# Patient Record
Sex: MALE | Race: WHITE | HISPANIC OR LATINO | Employment: OTHER | URBAN - METROPOLITAN AREA
[De-identification: names, ages, dates, MRNs, and addresses within clinical notes are randomized per-mention and may not be internally consistent; named-entity substitution may affect disease eponyms.]

---

## 2023-08-08 ENCOUNTER — TELEPHONE (OUTPATIENT)
Dept: PSYCHIATRY | Facility: CLINIC | Age: 43
End: 2023-08-08

## 2023-08-08 ENCOUNTER — OFFICE VISIT (OUTPATIENT)
Dept: FAMILY MEDICINE CLINIC | Facility: CLINIC | Age: 43
End: 2023-08-08
Payer: COMMERCIAL

## 2023-08-08 ENCOUNTER — TELEPHONE (OUTPATIENT)
Dept: FAMILY MEDICINE CLINIC | Facility: CLINIC | Age: 43
End: 2023-08-08

## 2023-08-08 VITALS
SYSTOLIC BLOOD PRESSURE: 132 MMHG | BODY MASS INDEX: 29.62 KG/M2 | HEIGHT: 69 IN | DIASTOLIC BLOOD PRESSURE: 86 MMHG | TEMPERATURE: 98.6 F | HEART RATE: 74 BPM | WEIGHT: 200 LBS | OXYGEN SATURATION: 99 %

## 2023-08-08 DIAGNOSIS — Z11.59 ENCOUNTER FOR HEPATITIS C SCREENING TEST FOR LOW RISK PATIENT: ICD-10-CM

## 2023-08-08 DIAGNOSIS — Z12.12 SCREENING FOR COLORECTAL CANCER: ICD-10-CM

## 2023-08-08 DIAGNOSIS — F41.9 ANXIETY AND DEPRESSION: ICD-10-CM

## 2023-08-08 DIAGNOSIS — L20.89 OTHER ATOPIC DERMATITIS: ICD-10-CM

## 2023-08-08 DIAGNOSIS — Z13.0 SCREENING FOR DEFICIENCY ANEMIA: ICD-10-CM

## 2023-08-08 DIAGNOSIS — Z11.4 SCREENING FOR HIV (HUMAN IMMUNODEFICIENCY VIRUS): ICD-10-CM

## 2023-08-08 DIAGNOSIS — Z13.6 SCREENING FOR CARDIOVASCULAR CONDITION: ICD-10-CM

## 2023-08-08 DIAGNOSIS — F43.10 PTSD (POST-TRAUMATIC STRESS DISORDER): ICD-10-CM

## 2023-08-08 DIAGNOSIS — F32.A ANXIETY AND DEPRESSION: ICD-10-CM

## 2023-08-08 DIAGNOSIS — E55.9 VITAMIN D DEFICIENCY: ICD-10-CM

## 2023-08-08 DIAGNOSIS — Z12.11 SCREENING FOR COLORECTAL CANCER: ICD-10-CM

## 2023-08-08 DIAGNOSIS — L20.89 OTHER ATOPIC DERMATITIS: Primary | ICD-10-CM

## 2023-08-08 DIAGNOSIS — E03.9 ACQUIRED HYPOTHYROIDISM: ICD-10-CM

## 2023-08-08 DIAGNOSIS — Z13.1 SCREENING FOR DIABETES MELLITUS: ICD-10-CM

## 2023-08-08 DIAGNOSIS — Z76.89 ESTABLISHING CARE WITH NEW DOCTOR, ENCOUNTER FOR: ICD-10-CM

## 2023-08-08 PROBLEM — K90.0 CELIAC DISEASE: Status: ACTIVE | Noted: 2023-08-08

## 2023-08-08 PROCEDURE — 99204 OFFICE O/P NEW MOD 45 MIN: CPT | Performed by: NURSE PRACTITIONER

## 2023-08-08 RX ORDER — LEVOTHYROXINE SODIUM 0.05 MG/1
50 TABLET ORAL DAILY
Qty: 30 TABLET | Refills: 0 | Status: SHIPPED | OUTPATIENT
Start: 2023-08-08

## 2023-08-08 RX ORDER — DOCUSATE SODIUM 100 MG/1
CAPSULE, LIQUID FILLED ORAL
COMMUNITY
Start: 2023-05-17 | End: 2023-08-08 | Stop reason: ALTCHOICE

## 2023-08-08 RX ORDER — LEVOTHYROXINE SODIUM 0.05 MG/1
50 TABLET ORAL DAILY
Qty: 30 TABLET | Refills: 0 | Status: SHIPPED | OUTPATIENT
Start: 2023-08-08 | End: 2023-08-08 | Stop reason: SDUPTHER

## 2023-08-08 RX ORDER — PRAZOSIN HYDROCHLORIDE 5 MG/1
5 CAPSULE ORAL DAILY
COMMUNITY
Start: 2023-05-18

## 2023-08-08 RX ORDER — PAROXETINE HYDROCHLORIDE 20 MG/1
20 TABLET, FILM COATED ORAL DAILY
COMMUNITY

## 2023-08-08 RX ORDER — PAROXETINE HYDROCHLORIDE 40 MG/1
TABLET, FILM COATED ORAL
COMMUNITY
Start: 2023-05-17

## 2023-08-08 RX ORDER — ARIPIPRAZOLE 15 MG/1
15 TABLET ORAL DAILY
COMMUNITY
Start: 2023-05-18

## 2023-08-08 RX ORDER — DOCUSATE SODIUM 50 MG AND SENNOSIDES 8.6 MG 8.6; 5 MG/1; MG/1
1 TABLET, FILM COATED ORAL EVERY MORNING
COMMUNITY
Start: 2023-05-17 | End: 2023-08-08 | Stop reason: ALTCHOICE

## 2023-08-08 RX ORDER — LINACLOTIDE 72 UG/1
1 CAPSULE, GELATIN COATED ORAL EVERY MORNING
COMMUNITY
Start: 2023-05-17

## 2023-08-08 RX ORDER — ERGOCALCIFEROL 1.25 MG/1
50000 CAPSULE ORAL WEEKLY
COMMUNITY
Start: 2023-05-17

## 2023-08-08 RX ORDER — LEVOTHYROXINE SODIUM 0.05 MG/1
50 TABLET ORAL DAILY
COMMUNITY
Start: 2023-05-17 | End: 2023-08-08 | Stop reason: SDUPTHER

## 2023-08-08 RX ORDER — GABAPENTIN 800 MG/1
800 TABLET ORAL 3 TIMES DAILY
COMMUNITY
Start: 2023-05-17

## 2023-08-08 NOTE — TELEPHONE ENCOUNTER
Contacted pt in regards to routine referral and to get placed on proper wait list.     Pt will call back when they are avail.

## 2023-08-08 NOTE — PROGRESS NOTES
Name: Josefina Huertas      : 1980      MRN: [id number]  Encounter Provider: NAY King  Encounter Date: 2023   Encounter department: [address] [place] [address]     1. Other atopic dermatitis  Comments:  Advised tepid water with bathing, pat skin dry, avoid scratching. To apply CeraVe daily. Topical steroid as prescribed. Advisefd to use sparingly. Orders:  -     HYDROCORTISONE, TOPICAL, 2 % LOTN; Apply topically 2 (two) times a day    2. Acquired hypothyroidism  Assessment & Plan:  Currently on levothyroxine 50 mcg. At this time will keep on current dose, advised to obtain labs as ordered. Orders:  -     TSH, 3rd generation with Free T4 reflex; Future  -     levothyroxine 50 mcg tablet; Take 1 tablet (50 mcg total) by mouth daily    3. Anxiety and depression  Assessment & Plan:  Stable on current medications. With being followed by behavioral health therapist and psychiatrist in [place], seeking for local providers. Referrals entered. Orders:  -     Ambulatory Referral to Psychiatry; Future  -     Ambulatory Referral to [facility]; Future    4. PTSD (post-traumatic stress disorder)  -     Ambulatory Referral to Psychiatry; Future  -     Ambulatory Referral to [facility]; Future    5. Vitamin D deficiency  Assessment & Plan:  Currently on 50,000 units of vitamin D weekly. We will recheck level. Orders:  -     Vitamin D 25 hydroxy; Future    6. Encounter for hepatitis C screening test for low risk patient  -     Hepatitis C antibody; Future    7. Screening for HIV (human immunodeficiency virus)  -     HIV 1/2 AG/AB w Reflex [facility] for 2 yr old and above; Future    8. Screening for cardiovascular condition  -     Lipid panel; Future    9. Screening for diabetes mellitus  -     Comprehensive metabolic panel; Future  -     Hemoglobin A1C; Future; Expected date: 2023    10.  Screening for deficiency anemia  - CBC and differential; Future    11. Screening for colorectal cancer  -     Ambulatory referral to Gastroenterology; Future    12. Establishing care with new doctor, encounter for         Subjective      Patient presents to the office accompanied by mother for establishment of care. Recently moved to [facility] from [facility] 3 weeks ago after the death of his brother. 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 Patient with history of hypothyroidism, anxiety and depression, and PTSD. Patient diagnosed back in 2017. Was being followed by psychiatry and [facility] in [facility], seeking for local providers. Stable on current medications. Patient with rash to bilateral arms. Has been treated for dermatitis in the past but ran out of cream that was being used by previous provider. Patient notes flareup of rash in the recent weeks. Notes that when the weather gets hotter and he starts to perspire more, rashes worse. Patient notes pruritus, has been attempting not to scratch or irritate area. Patient denies fever, open wounds, chills, drainage from rash. Review of Systems   Constitutional: Negative for chills, fever and unexpected weight change. HENT: Negative for sore throat and trouble swallowing. Eyes: Negative for photophobia and visual disturbance. Respiratory: Negative for cough and shortness of breath. Cardiovascular: Negative for chest pain and palpitations. Gastrointestinal: Negative for abdominal pain, nausea and vomiting. Genitourinary: Negative for decreased urine volume. Musculoskeletal: Negative for arthralgias and myalgias. Skin: Positive for rash. Negative for color change. Neurological: Negative for dizziness, weakness, light-headedness and headaches. Hematological: Negative for adenopathy. Psychiatric/Behavioral: Negative for agitation and dysphoric mood. The patient is not nervous/anxious.         Current Outpatient Medications on File Prior to Visit   Medication Sig   • ARIPiprazole (ABILIFY) 15 mg tablet Take 15 mg by mouth daily   • ergocalciferol (VITAMIN D2) 50,000 units Take 50,000 Units by mouth once a week   • gabapentin (NEURONTIN) 800 mg tablet Take 800 mg by mouth 3 (three) times a day   • Linzess 72 MCG CAPS Take 1 capsule by mouth every morning   • PARoxetine (PAXIL) 20 mg tablet Take 20 mg by mouth daily Takes with 40 mg tablet every morning   • PARoxetine (PAXIL) 40 MG tablet TAKE 1 TABLET BY MOUTH EVERY MORNING WITH 20MG TABLET   • prazosin (MINIPRESS) 5 mg capsule Take 5 mg by mouth daily   • [DISCONTINUED] levothyroxine 50 mcg tablet Take 50 mcg by mouth daily   • [DISCONTINUED] docusate sodium (COLACE) 100 mg capsule TAKE 1 CAPSULE BY MOUTH EVERY MORNING AND EVERY NIGHT AT BEDTIME (Patient not taking: Reported on 8/8/2023)   • [DISCONTINUED] Stimulant Laxative 8.6-50 MG per tablet Take 1 tablet by mouth every morning (Patient not taking: Reported on 8/8/2023)       Objective     /86 (BP Location: Left arm, Patient Position: Sitting, Cuff Size: Standard)   Pulse 74   Temp 98.6 °F (37 °C) (Tympanic)   Ht 5' 9" (1.753 m)   Wt 90.7 kg (200 lb)   SpO2 99%   BMI 29.53 kg/m²     Physical Exam  Vitals reviewed. Constitutional:       General: He is not in acute distress. Appearance: Normal appearance. He is not ill-appearing. HENT:      Head: Normocephalic and atraumatic. Right Ear: Tympanic membrane, ear canal and external ear normal.      Left Ear: Tympanic membrane, ear canal and external ear normal.      Nose: Nose normal.      Mouth/Throat:      Mouth: Mucous membranes are moist.      Pharynx: Oropharynx is clear. Eyes:      Conjunctiva/sclera: Conjunctivae normal.      Pupils: Pupils are equal, round, and reactive to light. Cardiovascular:      Rate and Rhythm: Normal rate and regular rhythm. Pulses: Normal pulses. Heart sounds: Normal heart sounds. No murmur heard.   Pulmonary:      Effort: Pulmonary effort is normal.      Breath sounds: Normal breath sounds. Abdominal:      General: Bowel sounds are normal.      Palpations: Abdomen is soft. Tenderness: There is no abdominal tenderness. Musculoskeletal:         General: Normal range of motion. Cervical back: Normal range of motion and neck supple. Skin:     General: Skin is warm and dry. Capillary Refill: Capillary refill takes less than 2 seconds. Findings: Rash (noted to bilateral forearms) present. Rash is macular and scaling. Neurological:      General: No focal deficit present. Mental Status: He is alert and oriented to person, place, and time.    Psychiatric:         Mood and Affect: Mood normal.         Behavior: Behavior normal.       NAY Saavedra

## 2023-08-08 NOTE — TELEPHONE ENCOUNTER
Patient's pharmacy called requesting hydrocordizone sent over was 2%.  They have 2.5%  Please send over corrected order

## 2023-08-08 NOTE — ASSESSMENT & PLAN NOTE
Stable on current medications. With being followed by behavioral health therapist and psychiatrist in [place], seeking for local providers. Referrals entered.

## 2023-08-08 NOTE — PATIENT INSTRUCTIONS
Eczema   AMBULATORY CARE:   Eczema  is an itchy, red skin rash. You are more likely to have it if your parent or a family member has eczema, asthma, or hay fever. Eczema is a long-term condition. You may have flare-ups from time to time for the rest of your life. Signs and symptoms:   Patches of dry, red, itchy skin    Bumps or blisters that crust over or ooze clear fluid    Areas of skin that are thick, scaly, or hard and leather-like    Eczema triggers:  Anything that increases dryness or makes you want to scratch is a trigger. Triggers may cause eczema to flare up. The following are common triggers:  Frequent baths or showers  can lead to dry, itchy skin. Sudden temperature changes , such as cold air, dries out your skin. Heat can increase sweating. Both can make you itch. Allergens  such as dust mites and pet dander can make your symptoms worse. Pollen, mold, and cigarette smoke may also irritate your skin. Some kinds of soap, makeup, and household   may bother your skin. Ask your healthcare provider about mild products you can use. Stress  may cause your eczema to get worse. Seek care immediately if:   You develop a fever or have red streaks going up your arm or leg. Your rash gets more swollen, red, or hot. Call your doctor if:   Most of your skin is red, swollen, painful, and covered with scales. You develop bloody, red, painful crusts. Your skin blisters and oozes white or yellow pus. You have questions or concerns about your condition or care. Treatment for eczema  is aimed at reducing pain and itching, and adding moisture to your skin. Your symptoms should improve after 3 weeks of treatment. There is no cure for eczema. You may need the following:  Medicines may help reduce itching, redness, pain, and swelling. They may be given as a cream or pill. You may also receive antibiotics if you have a skin infection.     Phototherapy , or light therapy, may help heal your skin. Care for your skin:   Do not scratch. Pat or press on your skin to relieve itching. Your symptoms will get worse if you scratch. Keep your fingernails short so you do not tear your skin if you do scratch. Keep your skin moist.  Rub lotion, cream, or ointment into your skin at least 2 times a day. Ask your healthcare provider what to use and how often to use it. Take baths or showers  with warm water for 10 minutes or less. Use mild bar soap. Ask your healthcare provider for the best soap for you to use. Wear cotton clothes. Wear loose-fitting clothes made from cotton or cotton blends. Avoid wool. Use a humidifier  to add moisture to the air in your home. Avoid changes in temperature , especially activities that cause you to sweat a lot. Sweat can cause itching. Remove blankets from your bed if you get hot while you sleep. Avoid allergens, dust, and skin irritants. Do not use perfume, fabric softener, or makeup that burns or itches. Follow up with your doctor as directed:  Write down your questions so you remember to ask them during your visits. © Copyright [employer] 2022 Information is for End User's use only and may not be sold, redistributed or otherwise used for commercial purposes. The above information is an  only. It is not intended as medical advice for individual conditions or treatments. Talk to your doctor, nurse or pharmacist before following any medical regimen to see if it is safe and effective for you.

## 2023-08-08 NOTE — ASSESSMENT & PLAN NOTE
Currently on levothyroxine 50 mcg. At this time will keep on current dose, advised to obtain labs as ordered.

## 2023-08-09 ENCOUNTER — APPOINTMENT (OUTPATIENT)
Dept: LAB | Facility: HOSPITAL | Age: 43
End: 2023-08-09
Payer: COMMERCIAL

## 2023-08-09 DIAGNOSIS — Z11.59 ENCOUNTER FOR HEPATITIS C SCREENING TEST FOR LOW RISK PATIENT: ICD-10-CM

## 2023-08-09 DIAGNOSIS — Z13.6 SCREENING FOR CARDIOVASCULAR CONDITION: ICD-10-CM

## 2023-08-09 DIAGNOSIS — E03.9 ACQUIRED HYPOTHYROIDISM: ICD-10-CM

## 2023-08-09 DIAGNOSIS — L20.89 OTHER ATOPIC DERMATITIS: Primary | ICD-10-CM

## 2023-08-09 DIAGNOSIS — E55.9 VITAMIN D DEFICIENCY: ICD-10-CM

## 2023-08-09 DIAGNOSIS — Z13.0 SCREENING FOR DEFICIENCY ANEMIA: ICD-10-CM

## 2023-08-09 DIAGNOSIS — Z11.4 SCREENING FOR HIV (HUMAN IMMUNODEFICIENCY VIRUS): ICD-10-CM

## 2023-08-09 DIAGNOSIS — Z13.1 SCREENING FOR DIABETES MELLITUS: ICD-10-CM

## 2023-08-09 LAB
25(OH)D3 SERPL-MCNC: 16.3 NG/ML (ref 30–100)
ALBUMIN SERPL BCP-MCNC: 4.7 G/DL (ref 3.5–5)
ALP SERPL-CCNC: 76 U/L (ref 34–104)
ALT SERPL W P-5'-P-CCNC: 68 U/L (ref 7–52)
ANION GAP SERPL CALCULATED.3IONS-SCNC: 5 MMOL/L
AST SERPL W P-5'-P-CCNC: 38 U/L (ref 13–39)
BASOPHILS # BLD AUTO: 0.06 THOUSANDS/ÂΜL (ref 0–0.1)
BASOPHILS NFR BLD AUTO: 1 % (ref 0–1)
BILIRUB SERPL-MCNC: 0.44 MG/DL (ref 0.2–1)
BUN SERPL-MCNC: 14 MG/DL (ref 5–25)
CALCIUM SERPL-MCNC: 10.1 MG/DL (ref 8.4–10.2)
CHLORIDE SERPL-SCNC: 105 MMOL/L (ref 96–108)
CHOLEST SERPL-MCNC: 206 MG/DL
CO2 SERPL-SCNC: 28 MMOL/L (ref 21–32)
CREAT SERPL-MCNC: 0.93 MG/DL (ref 0.6–1.3)
EOSINOPHIL # BLD AUTO: 0.18 THOUSAND/ÂΜL (ref 0–0.61)
EOSINOPHIL NFR BLD AUTO: 3 % (ref 0–6)
ERYTHROCYTE [DISTWIDTH] IN BLOOD BY AUTOMATED COUNT: 12.9 % (ref 11.6–15.1)
GFR SERPL CREATININE-BSD FRML MDRD: 98 ML/MIN/1.73SQ M
GLUCOSE P FAST SERPL-MCNC: 98 MG/DL (ref 65–99)
HCT VFR BLD AUTO: 44.7 % (ref 36.5–49.3)
HCV AB SER QL: NORMAL
HDLC SERPL-MCNC: 32 MG/DL
HGB BLD-MCNC: 15 G/DL (ref 12–17)
HIV 1+2 AB+HIV1 P24 AG SERPL QL IA: NORMAL
HIV 2 AB SERPL QL IA: NORMAL
HIV1 AB SERPL QL IA: NORMAL
HIV1 P24 AG SERPL QL IA: NORMAL
IMM GRANULOCYTES # BLD AUTO: 0.01 THOUSAND/UL (ref 0–0.2)
IMM GRANULOCYTES NFR BLD AUTO: 0 % (ref 0–2)
LDLC SERPL CALC-MCNC: 151 MG/DL (ref 0–100)
LYMPHOCYTES # BLD AUTO: 2.4 THOUSANDS/ÂΜL (ref 0.6–4.47)
LYMPHOCYTES NFR BLD AUTO: 36 % (ref 14–44)
MCH RBC QN AUTO: 29 PG (ref 26.8–34.3)
MCHC RBC AUTO-ENTMCNC: 33.6 G/DL (ref 31.4–37.4)
MCV RBC AUTO: 86 FL (ref 82–98)
MONOCYTES # BLD AUTO: 0.51 THOUSAND/ÂΜL (ref 0.17–1.22)
MONOCYTES NFR BLD AUTO: 8 % (ref 4–12)
NEUTROPHILS # BLD AUTO: 3.58 THOUSANDS/ÂΜL (ref 1.85–7.62)
NEUTS SEG NFR BLD AUTO: 52 % (ref 43–75)
NONHDLC SERPL-MCNC: 174 MG/DL
NRBC BLD AUTO-RTO: 0 /100 WBCS
PLATELET # BLD AUTO: 360 THOUSANDS/UL (ref 149–390)
PMV BLD AUTO: 10.3 FL (ref 8.9–12.7)
POTASSIUM SERPL-SCNC: 4 MMOL/L (ref 3.5–5.3)
PROT SERPL-MCNC: 7.9 G/DL (ref 6.4–8.4)
RBC # BLD AUTO: 5.18 MILLION/UL (ref 3.88–5.62)
SODIUM SERPL-SCNC: 138 MMOL/L (ref 135–147)
T4 FREE SERPL-MCNC: 0.73 NG/DL (ref 0.61–1.12)
TRIGL SERPL-MCNC: 113 MG/DL
TSH SERPL DL<=0.05 MIU/L-ACNC: 5.86 UIU/ML (ref 0.45–4.5)
WBC # BLD AUTO: 6.74 THOUSAND/UL (ref 4.31–10.16)

## 2023-08-09 PROCEDURE — 80061 LIPID PANEL: CPT

## 2023-08-09 PROCEDURE — 83036 HEMOGLOBIN GLYCOSYLATED A1C: CPT

## 2023-08-09 PROCEDURE — 85025 COMPLETE CBC W/AUTO DIFF WBC: CPT

## 2023-08-09 PROCEDURE — 84439 ASSAY OF FREE THYROXINE: CPT

## 2023-08-09 PROCEDURE — 80053 COMPREHEN METABOLIC PANEL: CPT

## 2023-08-09 PROCEDURE — 36415 COLL VENOUS BLD VENIPUNCTURE: CPT

## 2023-08-09 PROCEDURE — 82306 VITAMIN D 25 HYDROXY: CPT

## 2023-08-09 PROCEDURE — 86803 HEPATITIS C AB TEST: CPT

## 2023-08-09 PROCEDURE — 87389 HIV-1 AG W/HIV-1&-2 AB AG IA: CPT

## 2023-08-09 PROCEDURE — 84443 ASSAY THYROID STIM HORMONE: CPT

## 2023-08-10 LAB
EST. AVERAGE GLUCOSE BLD GHB EST-MCNC: 117 MG/DL
HBA1C MFR BLD: 5.7 %

## 2023-08-28 ENCOUNTER — TELEPHONE (OUTPATIENT)
Dept: PSYCHIATRY | Facility: CLINIC | Age: 43
End: 2023-08-28

## 2023-08-28 ENCOUNTER — TELEMEDICINE (OUTPATIENT)
Dept: BEHAVIORAL/MENTAL HEALTH CLINIC | Facility: CLINIC | Age: 43
End: 2023-08-28
Payer: COMMERCIAL

## 2023-08-28 DIAGNOSIS — F32.A ANXIETY AND DEPRESSION: ICD-10-CM

## 2023-08-28 DIAGNOSIS — F41.9 ANXIETY AND DEPRESSION: ICD-10-CM

## 2023-08-28 DIAGNOSIS — F43.10 PTSD (POST-TRAUMATIC STRESS DISORDER): Primary | ICD-10-CM

## 2023-08-28 PROCEDURE — 90834 PSYTX W PT 45 MINUTES: CPT | Performed by: SOCIAL WORKER

## 2023-08-29 NOTE — PSYCH
Visit start and stop times:    08/28/23  Start Time: 0900  Stop Time: 0942  Total Visit Time: 42 minutes  Virtual Regular Visit  Therapist met w/pt who is a 37year old male for initial session due to increased symptoms of anxiety, depression, and PTSD. Symptoms include: hopelessness, sadness, irritability, intrusive thoughts. He stated he recently relocated to [place] last month due to a tragic event. He stated he witnessed his brother get murdered in [facility] where they lived. He stated that he has had a lot of trauma in his life and has been in therapy since 2013. He stated that he lost custody of his kids in 2013 which was devastating for him. He stated that his happiest moment in life was when his kids were born. He stated he feels the medication he is on isn't working as much and has a referral for psychiatry. Pt stated that he sees his PCP in a few days. Therapist encouraged pt to talk to her as well to see if there is anything else she can do on her end. Therapist and pt discussed goals pt wants to work on. Pt stated that he has a lot of responsibilities and at times feels overwhelmed so needs additional support and a safe place to "vent."  Pt shared he can't let his family know how much he is struggling because he is the "strong" one. Pt stated that he has some coping skills that he has been trying to implement and at times they are helpful. Verification of patient location:    Patient is located at Home in the following state in which I hold an active license [place]      Assessment/Plan: f/u in 2 weeks    Problem List Items Addressed This Visit        Other    PTSD (post-traumatic stress disorder) - Primary    Anxiety and depression            Reason for visit is No chief complaint on file.        Encounter provider [employer]    Provider located at [address] [place] [address] [place] [address]  [phone]      Recent Visits  Date Type Provider Dept   08/28/23 Telemedicine [address] [place] [address] recent visits within past 7 days and meeting all other requirements  Future Appointments  No visits were found meeting these conditions. Showing future appointments within next 150 days and meeting all other requirements       The patient was identified by name and date of birth. Sebastian Chen was informed that this is a telemedicine visit and [facility] He agrees to proceed. .  My office door was closed. No one else was in the room. He acknowledged consent and understanding of privacy and security of the video platform. The patient has agreed to participate and understands they can discontinue the visit at any time. Patient is aware this is a billable service. Subjective  Sebastian Chen is a 37 y.o. male  .       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       Past Surgical History:   Procedure Laterality Date   • APPENDECTOMY         Current Outpatient Medications   Medication Sig Dispense Refill   • ARIPiprazole (ABILIFY) 15 mg tablet Take 15 mg by mouth daily     • ergocalciferol (VITAMIN D2) 50,000 units Take 50,000 Units by mouth once a week     • gabapentin (NEURONTIN) 800 mg tablet Take 800 mg by mouth 3 (three) times a day     • hydrocortisone 2.5 % cream Apply topically 2 (two) times a day 28 g 1   • levothyroxine 50 mcg tablet Take 1 tablet (50 mcg total) by mouth daily 30 tablet 0   • Linzess 72 MCG CAPS Take 1 capsule by mouth every morning     • PARoxetine (PAXIL) 20 mg tablet Take 20 mg by mouth daily Takes with 40 mg tablet every morning     • PARoxetine (PAXIL) 40 MG tablet TAKE 1 TABLET BY MOUTH EVERY MORNING WITH 20MG TABLET     • prazosin (MINIPRESS) 5 mg capsule Take 5 mg by mouth daily       No current facility-administered medications for this visit. Allergies   Allergen Reactions   • Bee Venom Eye Swelling and Facial Swelling   • Other Throat Swelling     mushrooms       Review of Systems    Video Exam    There were no vitals filed for this visit.     Physical Exam Pt denied any SI/HI/Ah/VH

## 2023-09-01 ENCOUNTER — OFFICE VISIT (OUTPATIENT)
Dept: FAMILY MEDICINE CLINIC | Facility: CLINIC | Age: 43
End: 2023-09-01
Payer: COMMERCIAL

## 2023-09-01 VITALS
BODY MASS INDEX: 29.47 KG/M2 | TEMPERATURE: 98.3 F | WEIGHT: 199 LBS | HEART RATE: 69 BPM | DIASTOLIC BLOOD PRESSURE: 94 MMHG | SYSTOLIC BLOOD PRESSURE: 146 MMHG | OXYGEN SATURATION: 100 % | HEIGHT: 69 IN

## 2023-09-01 DIAGNOSIS — K64.9 HEMORRHOIDS, UNSPECIFIED HEMORRHOID TYPE: Primary | ICD-10-CM

## 2023-09-01 DIAGNOSIS — E55.9 VITAMIN D DEFICIENCY: ICD-10-CM

## 2023-09-01 DIAGNOSIS — R73.03 PREDIABETES: ICD-10-CM

## 2023-09-01 DIAGNOSIS — E03.9 ACQUIRED HYPOTHYROIDISM: ICD-10-CM

## 2023-09-01 DIAGNOSIS — Z00.00 ANNUAL PHYSICAL EXAM: ICD-10-CM

## 2023-09-01 DIAGNOSIS — E78.2 MIXED HYPERLIPIDEMIA: ICD-10-CM

## 2023-09-01 PROCEDURE — 99214 OFFICE O/P EST MOD 30 MIN: CPT | Performed by: NURSE PRACTITIONER

## 2023-09-01 PROCEDURE — 99396 PREV VISIT EST AGE 40-64: CPT | Performed by: NURSE PRACTITIONER

## 2023-09-01 RX ORDER — PROPRANOLOL HYDROCHLORIDE 20 MG/1
20 TABLET ORAL EVERY EVENING
COMMUNITY
Start: 2023-08-10

## 2023-09-01 RX ORDER — HYDROCORTISONE ACETATE 25 MG/1
25 SUPPOSITORY RECTAL 2 TIMES DAILY
Qty: 12 SUPPOSITORY | Refills: 0 | Status: SHIPPED | OUTPATIENT
Start: 2023-09-01

## 2023-09-01 RX ORDER — ERGOCALCIFEROL 1.25 MG/1
50000 CAPSULE ORAL 2 TIMES WEEKLY
Qty: 24 CAPSULE | Refills: 0 | Status: SHIPPED | OUTPATIENT
Start: 2023-09-04

## 2023-09-01 RX ORDER — LEVOTHYROXINE SODIUM 0.07 MG/1
75 TABLET ORAL
Qty: 90 TABLET | Refills: 3 | Status: SHIPPED | OUTPATIENT
Start: 2023-09-01

## 2023-09-01 RX ORDER — ROSUVASTATIN CALCIUM 5 MG/1
5 TABLET, COATED ORAL DAILY
Qty: 90 TABLET | Refills: 1 | Status: SHIPPED | OUTPATIENT
Start: 2023-09-01

## 2023-09-01 RX ORDER — OMEPRAZOLE 40 MG/1
40 CAPSULE, DELAYED RELEASE ORAL DAILY
COMMUNITY
Start: 2023-08-10

## 2023-09-01 RX ORDER — DOCUSATE SODIUM 50 MG AND SENNOSIDES 8.6 MG 8.6; 5 MG/1; MG/1
1 TABLET, FILM COATED ORAL DAILY
COMMUNITY
Start: 2023-08-10

## 2023-09-01 RX ORDER — DOCUSATE SODIUM 100 MG/1
100 CAPSULE, LIQUID FILLED ORAL 2 TIMES DAILY
COMMUNITY
Start: 2023-08-10

## 2023-09-01 RX ORDER — HYDROXYZINE PAMOATE 50 MG/1
CAPSULE ORAL
COMMUNITY
Start: 2023-08-10

## 2023-09-01 NOTE — ASSESSMENT & PLAN NOTE
Recent blood work and ASCVD risk score reviewed with patient. Options to lower lipid panel discussed including low-fat diet, exercise, weight loss, and statin. Patient is agreeable to initiation of statin at this time. To start rosuvastatin 5 mg. To repeat labs in 3 to 4 months.

## 2023-09-01 NOTE — PROGRESS NOTES
[address]    NAME: Reanna Ramachandran  AGE: 37 y.o. SEX: male  : 1980     DATE: 2023     Assessment and Plan:     Problem List Items Addressed This Visit        Endocrine    Acquired hypothyroidism     TSH not within normal limits. As per patient, takes levothyroxine as prescribed. We will increase levothyroxine from 50 mcg to 75 mcg. Repeat blood work in 6 to 8 weeks         Relevant Medications    propranolol (INDERAL) 20 mg tablet    levothyroxine (Euthyrox) 75 mcg tablet    Other Relevant Orders    TSH, 3rd generation with Free T4 reflex       Other    Vitamin D deficiency     Vitamin D level still deficient despite being on weekly dose of vitamin D. At this time advised patient to take vitamin D 50,000 units twice weekly with food for 12 weeks. Repeat labs in 3 to 4 months. Relevant Medications    ergocalciferol (VITAMIN D2) 50,000 units (Start on 2023)    Other Relevant Orders    Vitamin D 25 hydroxy    Prediabetes     Recent blood work reviewed with patient. Discussed options to help lower A1C such as aerobic activity, weight management, foods that are low in starch and eating leaner protein and whole grains. Mixed hyperlipidemia     Recent blood work and ASCVD risk score reviewed with patient. Options to lower lipid panel discussed including low-fat diet, exercise, weight loss, and statin. Patient is agreeable to initiation of statin at this time. To start rosuvastatin 5 mg. To repeat labs in 3 to 4 months. Relevant Medications    rosuvastatin (CRESTOR) 5 mg tablet    Other Relevant Orders    Lipid panel    Comprehensive metabolic panel   Other Visit Diagnoses     Hemorrhoids, unspecified hemorrhoid type    -  Primary    Advised increase hydration, fiber. To avoid straining and heavy lifting. Medications as prescribed.  To scheduled colonoscopy, refer to [place]-rectal if tx fails    Relevant Medications    hydrocortisone (ANUSOL-HC) 25 mg suppository    Annual physical exam              Immunizations and preventive care screenings were discussed with patient today. Appropriate education was printed on patient's after visit summary. Discussed risks and benefits of prostate cancer screening. We discussed the controversial history of PSA screening for prostate cancer in the [facility] as well as the risk of over detection and over treatment of prostate cancer by way of PSA screening. The patient understands that PSA blood testing is an imperfect way to screen for prostate cancer and that elevated PSA levels in the blood may also be caused by infection, inflammation, prostatic trauma or manipulation, urological procedures, or by benign prostatic enlargement. The role of the digital rectal examination in prostate cancer screening was also discussed and I discussed with him that there is large interobserver variability in the findings of digital rectal examination. Counseling:  Alcohol/drug use: discussed moderation in alcohol intake, the recommendations for healthy alcohol use, and avoidance of illicit drug use. Dental Health: discussed importance of regular tooth brushing, flossing, and dental visits. Injury prevention: discussed safety/seat belts, safety helmets, smoke detectors, carbon dioxide detectors, and smoking near bedding or upholstery. Sexual health: discussed sexually transmitted diseases, partner selection, use of condoms, avoidance of unintended pregnancy, and contraceptive alternatives. · Exercise: the importance of regular exercise/physical activity was discussed. Recommend exercise 3-5 times per week for at least 30 minutes. BMI Counseling: Body mass index is 29.39 kg/m².  The BMI is above normal. Nutrition recommendations include decreasing portion sizes, encouraging healthy choices of fruits and vegetables, consuming healthier snacks, limiting drinks that contain sugar, moderation in carbohydrate intake, increasing intake of lean protein, reducing intake of saturated and trans fat and reducing intake of cholesterol. Exercise recommendations include exercising 3-5 times per week and strength training exercises. No pharmacotherapy was ordered. Rationale for BMI follow-up plan is due to patient being overweight or obese. Return in about 4 months (around 1/1/2024) for Recheck. Chief Complaint:     Chief Complaint   Patient presents with   • Annual Exam      History of Present Illness:     Adult Annual Physical   Patient here for a comprehensive physical exam. The patient reports problems - Recurrence of hemorrhoids. Notes history of internal and external hemorrhoids. Notes increased rectal pain and discomfort, especially prior to bowel movements. Notes history of Celiac's with irregular bowel movements. Does use Colace. Has been using Preparation H with mild relief. Patient denies melena, abdominal pain. Diet and Physical Activity  · Diet/Nutrition: gluten-free diet. · Exercise: walking and 5-7 times a week on average. Depression Screening  PHQ-2/9 Depression Screening         General Health  · Sleep: gets 4-6 hours of sleep on average. · Hearing: normal - bilateral.  · Vision: most recent eye exam >1 year ago and wears glasses. · Dental: no dental visits for >1 year, brushes teeth twice daily and does not floss.  Health  · Symptoms include: none     Review of Systems:     Review of Systems   Constitutional: Negative for activity change, appetite change, diaphoresis and unexpected weight change. HENT: Negative for congestion, ear pain, sinus pressure, sinus pain, sore throat and trouble swallowing. Eyes: Negative for photophobia and visual disturbance. Respiratory: Negative for cough, chest tightness and shortness of breath. Cardiovascular: Negative for chest pain and palpitations.    Gastrointestinal: Positive for rectal pain. Negative for abdominal pain, anal bleeding, blood in stool, constipation, diarrhea, nausea and vomiting. Endocrine: Negative for polydipsia, polyphagia and polyuria. Genitourinary: Negative for decreased urine volume, dysuria, flank pain, frequency, hematuria, testicular pain and urgency. Musculoskeletal: Negative for arthralgias, back pain and myalgias. Skin: Negative for color change and rash. Neurological: Negative for dizziness, syncope, weakness, light-headedness, numbness and headaches. Hematological: Negative for adenopathy. Psychiatric/Behavioral: Negative for confusion and dysphoric mood. The patient is not nervous/anxious. 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      Past Surgical History:     Past Surgical History:   Procedure Laterality Date   • APPENDECTOMY        Family History:     Family History   Problem Relation Age of Onset   • Alzheimer's disease Mother    • Hypothyroidism Mother    • No Known Problems Father       Social History:     Social History     Socioeconomic History   • Marital status: Single     Spouse name: None   • Number of children: None   • Years of education: None   • Highest education level: None   Occupational History   • None   Tobacco Use   • Smoking status: Every Day     Packs/day: 0.25     Years: 5.00     Total pack years: 1.25     Types: Cigarettes     Start date: 44     Passive exposure:  Yes   • Smokeless tobacco: Never   Vaping Use   • Vaping Use: Never used   Substance and Sexual Activity   • Alcohol use: Never   • Drug use: Never   • Sexual activity: Not Currently     Partners: Female     Birth control/protection: None   Other Topics Concern   • None   Social History Narrative   • None     Social Determinants of Health     Financial Resource Strain: Not on file   Food Insecurity: Not on file   Transportation Needs: Not on file   Physical Activity: Not on file   Stress: Not on file   Social Connections: Not on file   Intimate Partner Violence: Not on file   Housing Stability: Not on file      Current Medications:     Current Outpatient Medications   Medication Sig Dispense Refill   • ARIPiprazole (ABILIFY) 15 mg tablet Take 15 mg by mouth daily     • docusate sodium (COLACE) 100 mg capsule Take 100 mg by mouth 2 (two) times a day     • [START ON 9/4/2023] ergocalciferol (VITAMIN D2) 50,000 units Take 1 capsule (50,000 Units total) by mouth 2 (two) times a week 24 capsule 0   • gabapentin (NEURONTIN) 800 mg tablet Take 800 mg by mouth 3 (three) times a day     • hydrocortisone (ANUSOL-HC) 25 mg suppository Insert 1 suppository (25 mg total) into the rectum 2 (two) times a day 12 suppository 0   • hydrocortisone 2.5 % cream Apply topically 2 (two) times a day 28 g 1   • hydrOXYzine pamoate (VISTARIL) 50 mg capsule TAKE 1 CAPSULE BY MOUTH THREE TIMES DAILY AS NEEDED FOR ANXIETY     • levothyroxine (Euthyrox) 75 mcg tablet Take 1 tablet (75 mcg total) by mouth daily in the early morning 90 tablet 3   • Linzess 72 MCG CAPS Take 1 capsule by mouth every morning     • omeprazole (PriLOSEC) 40 MG capsule Take 40 mg by mouth daily     • PARoxetine (PAXIL) 20 mg tablet Take 20 mg by mouth daily Takes with 40 mg tablet every morning     • PARoxetine (PAXIL) 40 MG tablet TAKE 1 TABLET BY MOUTH EVERY MORNING WITH 20MG TABLET     • prazosin (MINIPRESS) 5 mg capsule Take 5 mg by mouth daily     • propranolol (INDERAL) 20 mg tablet Take 20 mg by mouth every evening     • rosuvastatin (CRESTOR) 5 mg tablet Take 1 tablet (5 mg total) by mouth daily 90 tablet 1   • Stimulant Laxative 8.6-50 MG per tablet Take 1 tablet by mouth daily       No current facility-administered medications for this visit. Allergies:      Allergies   Allergen Reactions   • Bee Venom Eye Swelling and Facial Swelling   • Other Throat Swelling     mushrooms      Physical Exam:     /94 (BP Location: Left arm, Patient Position: Sitting)   Pulse 69   Temp 98.3 °F (36.8 °C)   Ht 5' 9" (1.753 m)   Wt 90.3 kg (199 lb)   SpO2 100%   BMI 29.39 kg/m²     Physical Exam  Vitals reviewed. Constitutional:       General: He is not in acute distress. Appearance: Normal appearance. He is well-developed. He is not ill-appearing. HENT:      Head: Normocephalic and atraumatic. Right Ear: Tympanic membrane, ear canal and external ear normal.      Left Ear: Tympanic membrane, ear canal and external ear normal.      Nose: Nose normal.      Mouth/Throat:      Mouth: Mucous membranes are moist.      Pharynx: Oropharynx is clear. Eyes:      Extraocular Movements: Extraocular movements intact. Conjunctiva/sclera: Conjunctivae normal.      Pupils: Pupils are equal, round, and reactive to light. Neck:      Vascular: No carotid bruit. Cardiovascular:      Rate and Rhythm: Normal rate and regular rhythm. Pulses: Normal pulses. Heart sounds: Normal heart sounds. No murmur heard. Pulmonary:      Effort: Pulmonary effort is normal. No respiratory distress. Breath sounds: Normal breath sounds. Abdominal:      General: Bowel sounds are normal.      Palpations: Abdomen is soft. There is no mass. Tenderness: There is no abdominal tenderness. There is no right CVA tenderness or left CVA tenderness. Genitourinary:     Comments: Patient refusing rectal exam at this time  Musculoskeletal:         General: No swelling. Normal range of motion. Cervical back: Normal range of motion and neck supple. Right lower leg: No edema. Left lower leg: No edema. Lymphadenopathy:      Cervical: No cervical adenopathy. Skin:     General: Skin is warm and dry. Capillary Refill: Capillary refill takes less than 2 seconds. Neurological:      General: No focal deficit present. Mental Status: He is alert. Psychiatric:         Mood and Affect: Mood normal.         Behavior: Behavior normal.          Elyssa Jones, [address] [facility]

## 2023-09-01 NOTE — ASSESSMENT & PLAN NOTE
TSH not within normal limits. As per patient, takes levothyroxine as prescribed. We will increase levothyroxine from 50 mcg to 75 mcg.   Repeat blood work in 6 to 8 weeks

## 2023-09-01 NOTE — ASSESSMENT & PLAN NOTE
Vitamin D level still deficient despite being on weekly dose of vitamin D. At this time advised patient to take vitamin D 50,000 units twice weekly with food for 12 weeks. Repeat labs in 3 to 4 months.

## 2023-09-01 NOTE — PATIENT INSTRUCTIONS
Recheck TSH in 6-8 weeks  Recheck lipid panel, CMP, Vitamin D level in 3-4 months        Wellness Visit for Adults   AMBULATORY CARE:   A wellness visit  is when you see your healthcare provider to get screened for health problems. Your healthcare provider will also give you advice on how to stay healthy. Write down your questions so you remember to ask them. Ask your healthcare provider how often you should have a wellness visit. What happens at a wellness visit:  Your healthcare provider will ask about your health, and your family history of health problems. This includes high blood pressure, heart disease, and cancer. He or she will ask if you have symptoms that concern you, if you smoke, and about your mood. You may also be asked about your intake of medicines, supplements, food, and alcohol. Any of the following may be done: Your weight  will be checked. Your height may also be checked so your body mass index (BMI) can be calculated. Your BMI shows if you are at a healthy weight. Your blood pressure  and heart rate will be checked. Your temperature may also be checked. Blood and urine tests  may be done. Blood tests may be done to check your cholesterol levels. Abnormal cholesterol levels increase your risk for heart disease and stroke. You may also need a blood or urine test to check for diabetes if you are at increased risk. Urine tests may be done to look for signs of an infection or kidney disease. A physical exam  includes checking your heartbeat and lungs with a stethoscope. Your healthcare provider may also check your skin to look for sun damage. Screening tests  may be recommended. A screening test is done to check for diseases that may not cause symptoms. The screening tests you may need depend on your age, gender, family history, and lifestyle habits. For example, colorectal screening may be recommended if you are 48years old or older.     Screening tests you need if you are a woman:   A Pap smear  is used to screen for cervical cancer. Pap smears are usually done every 3 to 5 years depending on your age. You may need them more often if you have had abnormal Pap smear test results in the past. Ask your healthcare provider how often you should have a Pap smear. A mammogram  is an x-ray of your breasts to screen for breast cancer. Experts recommend mammograms every 2 years starting at age 48 years. You may need a mammogram at age 52 years or younger if you have an increased risk for breast cancer. Talk to your healthcare provider about when you should start having mammograms and how often you need them. Vaccines you may need:   Get an influenza vaccine  every year. The influenza vaccine protects you from the flu. Several types of viruses cause the flu. The viruses change over time, so new vaccines are made each year. Get a tetanus-diphtheria (Td) booster vaccine  every 10 years. This vaccine protects you against tetanus and diphtheria. Tetanus is a severe infection that may cause painful muscle spasms and lockjaw. Diphtheria is a severe bacterial infection that causes a thick covering in the back of your mouth and throat. Get a human papillomavirus (HPV) vaccine  if you are female and aged 23 to 32 or male 23 to 24 and never received it. This vaccine protects you from HPV infection. HPV is the most common infection spread by sexual contact. HPV may also cause vaginal, penile, and anal cancers. Get a pneumococcal vaccine  if you are aged 72 years or older. The pneumococcal vaccine is an injection given to protect you from pneumococcal disease. Pneumococcal disease is an infection caused by pneumococcal bacteria. The infection may cause pneumonia, meningitis, or an ear infection. Get a shingles vaccine  if you are 60 or older, even if you have had shingles before. The shingles vaccine is an injection to protect you from the varicella-zoster virus.  This is the same virus that causes chickenpox. Shingles is a painful rash that develops in people who had chickenpox or have been exposed to the virus. How to eat healthy:  My Plate is a model for planning healthy meals. It shows the types and amounts of foods that should go on your plate. Fruits and vegetables make up about half of your plate, and grains and protein make up the other half. A serving of dairy is included on the side of your plate. The amount of calories and serving sizes you need depends on your age, gender, weight, and height. Examples of healthy foods are listed below:  Eat a variety of vegetables  such as dark green, red, and orange vegetables. You can also include canned vegetables low in sodium (salt) and frozen vegetables without added butter or sauces. Eat a variety of fresh fruits , canned fruit in 100% juice, frozen fruit, and dried fruit. Include whole grains. At least half of the grains you eat should be whole grains. Examples include whole-wheat bread, wheat pasta, brown rice, and whole-grain cereals such as oatmeal.    Eat a variety of protein foods such as seafood (fish and shellfish), lean meat, and poultry without skin (turkey and chicken). Examples of lean meats include pork leg, shoulder, or tenderloin, and beef round, sirloin, tenderloin, and extra lean ground beef. Other protein foods include eggs and egg substitutes, beans, peas, soy products, nuts, and seeds. Choose low-fat dairy products such as skim or 1% milk or low-fat yogurt, cheese, and cottage cheese. Limit unhealthy fats  such as butter, hard margarine, and shortening. Exercise:  Exercise at least 30 minutes per day on most days of the week. Some examples of exercise include walking, biking, dancing, and swimming. You can also fit in more physical activity by taking the stairs instead of the elevator or parking farther away from stores. Include muscle strengthening activities 2 days each week.  Regular exercise provides many health benefits. It helps you manage your weight, and decreases your risk for type 2 diabetes, heart disease, stroke, and high blood pressure. Exercise can also help improve your mood. Ask your healthcare provider about the best exercise plan for you. General health and safety guidelines:   Do not smoke. Nicotine and other chemicals in cigarettes and cigars can cause lung damage. Ask your healthcare provider for information if you currently smoke and need help to quit. E-cigarettes or smokeless tobacco still contain nicotine. Talk to your healthcare provider before you use these products. Limit alcohol. A drink of alcohol is 12 ounces of beer, 5 ounces of wine, or 1½ ounces of liquor. Lose weight, if needed. Being overweight increases your risk of certain health conditions. These include heart disease, high blood pressure, type 2 diabetes, and certain types of cancer. Protect your skin. Do not sunbathe or use tanning beds. Use sunscreen with a SPF 15 or higher. Apply sunscreen at least 15 minutes before you go outside. Reapply sunscreen every 2 hours. Wear protective clothing, hats, and sunglasses when you are outside. Drive safely. Always wear your seatbelt. Make sure everyone in your car wears a seatbelt. A seatbelt can save your life if you are in an accident. Do not use your cell phone when you are driving. This could distract you and cause an accident. Pull over if you need to make a call or send a text message. Practice safe sex. Use latex condoms if are sexually active and have more than one partner. Your healthcare provider may recommend screening tests for sexually transmitted infections (STIs). Wear helmets, lifejackets, and protective gear. Always wear a helmet when you ride a bike or motorcycle, go skiing, or play sports that could cause a head injury. Wear protective equipment when you play sports. Wear a lifejacket when you are on a boat or doing water sports.     © Copyright [employer] 2022 Information is for End User's use only and may not be sold, redistributed or otherwise used for commercial purposes. The above information is an  only. It is not intended as medical advice for individual conditions or treatments. Talk to your doctor, nurse or pharmacist before following any medical regimen to see if it is safe and effective for you.

## 2023-09-01 NOTE — ASSESSMENT & PLAN NOTE
Recent blood work reviewed with patient. Discussed options to help lower A1C such as aerobic activity, weight management, foods that are low in starch and eating leaner protein and whole grains.

## 2023-09-11 ENCOUNTER — TELEPHONE (OUTPATIENT)
Dept: PSYCHIATRY | Facility: CLINIC | Age: 43
End: 2023-09-11

## 2023-09-12 ENCOUNTER — SOCIAL WORK (OUTPATIENT)
Dept: BEHAVIORAL/MENTAL HEALTH CLINIC | Facility: CLINIC | Age: 43
End: 2023-09-12
Payer: COMMERCIAL

## 2023-09-12 DIAGNOSIS — F43.10 PTSD (POST-TRAUMATIC STRESS DISORDER): Primary | ICD-10-CM

## 2023-09-12 DIAGNOSIS — F41.9 ANXIETY AND DEPRESSION: ICD-10-CM

## 2023-09-12 DIAGNOSIS — F32.A ANXIETY AND DEPRESSION: ICD-10-CM

## 2023-09-12 PROCEDURE — 90834 PSYTX W PT 45 MINUTES: CPT | Performed by: SOCIAL WORKER

## 2023-09-13 ENCOUNTER — TELEPHONE (OUTPATIENT)
Dept: PSYCHIATRY | Facility: CLINIC | Age: 43
End: 2023-09-13

## 2023-09-13 NOTE — TELEPHONE ENCOUNTER
Behavioral Health Outpatient Intake Questions    Referred By   : ROHILARIA    Please advise interviewee that they need to answer all questions truthfully to allow for best care, and any misrepresentations of information may affect their ability to be seen at this clinic   => Was this discussed? Yes     If Minor Child (under age 25)    Who is/are the legal guardian(s) of the child? Is there a custody agreement? No     • If "YES"- Custody orders must be obtained prior to scheduling the first appointment  • In addition, Consent to Treatment must be signed by all legal guardians prior to scheduling the first appointment    • If "NO"- Consent to Treatment must be signed by all legal guardians prior to scheduling the first appointment      [address] History -     Presenting Problem (in patient's own words): depression trauma anxiety     Are there any communication barriers for this patient? No                                               If yes, please describe barriers:   • If there is a unique situation, please refer to [address] for final determination. Are you taking any psychiatric medications? No   •   If "YES" -What are they   •   If "YES" -Who prescribes? Has the Patient previously received outpatient Talk Therapy or Medication Management from [facility]      •    If "YES"- When, Where and with Whom? •    If "NO" -Has Patient received these services elsewhere? •   If "YES" -When, Where, and with Whom? Has the Patient abused alcohol or other substances in the last 6 months ? No  No concerns of substance abuse are reported. •  If "YES" -What substance, How much, How often? •  If illegal substance: Refer [facility] •  If Alcohol in excess of 10 drinks per week:  Refer to [facility] (for [employer]) or [facility] History-     Is this treatment court ordered?  No   If "yes "send to :  • Talk Therapy : Send to [facility] for final determination   • Med Management: Send to Dr Maria Alejandra Xie for final determination     Has the Patient been convicted of a felony? No   If "Yes" send to -When, What? • Talk Therapy : Send to [facility] for final determination   • Med Management: Send to Dr Maria Alejandra Xie for final determination     ACCEPTED as a patient Yes  • If "Yes" Appointment Date: 11/14/ @ 12:30 w/ Amrit Wilson     Referred Elsewhere? No  • If “Yes” - (Where? Ex: [facility], [address] [place] [address], etc.)       Name of Rola Walker #[id number]  Insurance Phone #  If ins is primary or secondary? If patient is a minor, parents information such as Name, D. O.B of guarantor.

## 2023-09-18 NOTE — PSYCH
Behavioral Health Psychotherapy Progress Note    Psychotherapy Provided: Individual Psychotherapy     1. Anxiety and depression            DATA: Therapist met w/pt for individual session. Pt stated that he continues to struggle w/heightened symptoms: flashbacks, feeling numb at times, overwhelmed, sadness. Therapist and pt continued to build rapport. Pt shared that he has a lot of responsibilities and taking care of everyone is hard but wouldn't do anything different. He expressed having a hard time with the recent death of his brother and now trying to find purpose. Therapist assessed for SI but pt denied any SI. Therapist and pt discussed grounding skills to practice to help w/the flashbacks and recent trauma. During this session, this clinician used the following therapeutic modalities: Cognitive Behavioral Therapy, Motivational Interviewing and Solution-Focused Therapy    Substance Abuse was not addressed during this session. If the client is diagnosed with a co-occurring substance use disorder, please indicate any changes in the frequency or amount of use: unknown. Stage of change for addressing substance use diagnoses: No substance use/Not applicable    ASSESSMENT:  Pito Cowan presents with a Depressed mood. his affect is Blunted, which is congruent, with his mood and the content of the session. The client has not made progress on their goals. Pito Cowan presents with a minimal risk of suicide, minimal risk of self-harm, and none risk of harm to others. For any risk assessment that surpasses a "low" rating, a safety plan must be developed. A safety plan was indicated: none  If yes, describe in detail n/a    PLAN: Between sessions, Pito Cowan will continue to practice grounding tools, set up an appointment w/psych.  At the next session, the therapist will use Cognitive Behavioral Therapy, Mindfulness-based Strategies, Motivational Interviewing and Solution-Focused Therapy to address symptoms of PTSD. Behavioral Health Treatment Plan and Discharge Planning: Kian Walsh is aware of and agrees to continue to work on their treatment plan. They have identified and are working toward their discharge goals.      Visit start and stop times:    09/12/23  Start Time: 1400  Stop Time: 1446  Total Visit Time: 46 minutes

## 2023-09-26 ENCOUNTER — SOCIAL WORK (OUTPATIENT)
Dept: BEHAVIORAL/MENTAL HEALTH CLINIC | Facility: CLINIC | Age: 43
End: 2023-09-26

## 2023-09-26 DIAGNOSIS — F41.9 ANXIETY AND DEPRESSION: ICD-10-CM

## 2023-09-26 DIAGNOSIS — F32.A ANXIETY AND DEPRESSION: ICD-10-CM

## 2023-09-26 DIAGNOSIS — F43.10 PTSD (POST-TRAUMATIC STRESS DISORDER): Primary | ICD-10-CM

## 2023-09-26 PROCEDURE — 90834 PSYTX W PT 45 MINUTES: CPT | Performed by: SOCIAL WORKER

## 2023-10-02 NOTE — PSYCH
Behavioral Health Psychotherapy Progress Note    Psychotherapy Provided: Individual Psychotherapy     1. PTSD (post-traumatic stress disorder)        2. Anxiety and depression            DATA: Therapist met w/pt for individual session. Pt stated that he continues to go through the motions. He identified getting into some conflict with his niece and nephew but is hoping that it will resolve itself soon. He stated he continues to struggle w/flashbacks, irritability, feeling numb. He stated that he takes so much time taking care of others and realizes once his mother is taken care of he needs to take some time for himself. Therapist and pt discussed that he is still utilizing the grounding skills but is looking forward to meeting with the psychiatrist in November. During this session, this clinician used the following therapeutic modalities: Cognitive Behavioral Therapy, Motivational Interviewing and Solution-Focused Therapy    Substance Abuse was not addressed during this session. If the client is diagnosed with a co-occurring substance use disorder, please indicate any changes in the frequency or amount of use: unknown. Stage of change for addressing substance use diagnoses: No substance use/Not applicable    ASSESSMENT:  Ray Potter presents with a Anxious mood. his affect is Normal range and intensity, which is congruent, with his mood and the content of the session. The client has made progress on their goals. Ray Potter presents with a minimal risk of suicide, minimal risk of self-harm, and none risk of harm to others. For any risk assessment that surpasses a "low" rating, a safety plan must be developed. A safety plan was indicated: none  If yes, describe in detail n/a    PLAN: Between sessions, Ray Potter will continue to practice grounding skills and utilizing his supports.  At the next session, the therapist will use Cognitive Behavioral Therapy, Motivational Interviewing and Solution-Focused Therapy to address symptoms of anxiety, depression, PTSD. Behavioral Health Treatment Plan and Discharge Planning: Weston Holt is aware of and agrees to continue to work on their treatment plan. They have identified and are working toward their discharge goals.      Visit start and stop times:    09/26/23  Start Time: 1615  Stop Time: 1700  Total Visit Time: 45 minutes

## 2023-10-04 ENCOUNTER — TELEPHONE (OUTPATIENT)
Dept: BEHAVIORAL/MENTAL HEALTH CLINIC | Facility: CLINIC | Age: 43
End: 2023-10-04

## 2023-10-10 ENCOUNTER — TELEPHONE (OUTPATIENT)
Dept: FAMILY MEDICINE CLINIC | Facility: CLINIC | Age: 43
End: 2023-10-10

## 2023-10-10 NOTE — TELEPHONE ENCOUNTER
Faxed med rec release form to patient's previous family medicine office. [address] [place] [address]  Phone: [phone]  Fax: [phone]    Confirmed previous patient there, last OV 06/2023.

## 2023-11-14 ENCOUNTER — TELEPHONE (OUTPATIENT)
Dept: PSYCHIATRY | Facility: CLINIC | Age: 43
End: 2023-11-14

## 2023-11-14 NOTE — TELEPHONE ENCOUNTER
Pt send a message requesting to cancel his appts with Jameson Comes. The reason is that pt wants a provider closer to his location in [facility]. Writer proceeded to cancel his appts on 11/4/2023 at 12:30 p.m. and 12/12/2023 at 11:00 a.m.

## 2023-11-14 NOTE — TELEPHONE ENCOUNTER
Spoke to patient in regards to scheduling at [facility] office, Writer informed patient office is 50 min from home. Writer advise patient if office is to far to [employer] for a list provider in area. Advise patient to contact office if services are needed. Patient verbalized understanding.

## 2023-12-28 ENCOUNTER — TELEPHONE (OUTPATIENT)
Dept: FAMILY MEDICINE CLINIC | Facility: CLINIC | Age: 43
End: 2023-12-28

## 2023-12-28 NOTE — TELEPHONE ENCOUNTER
Called pt and is aware of next week's appointment and the repeat blood work that was placed in September to get done for appointment. Told him they were fasting labs and to drink plenty of water beforehand.

## 2024-01-05 ENCOUNTER — TELEPHONE (OUTPATIENT)
Dept: FAMILY MEDICINE CLINIC | Facility: CLINIC | Age: 44
End: 2024-01-05

## 2024-01-05 NOTE — TELEPHONE ENCOUNTER
Called pt and reminded him of the lab work that is to be completed before Tuesday's appointment. Let him know they were fasting labs and to drink plenty of water before getting them done.

## 2024-01-06 ENCOUNTER — APPOINTMENT (OUTPATIENT)
Dept: LAB | Facility: HOSPITAL | Age: 44
End: 2024-01-06
Payer: COMMERCIAL

## 2024-01-06 DIAGNOSIS — E78.2 MIXED HYPERLIPIDEMIA: ICD-10-CM

## 2024-01-06 DIAGNOSIS — E03.9 ACQUIRED HYPOTHYROIDISM: ICD-10-CM

## 2024-01-06 DIAGNOSIS — E55.9 VITAMIN D DEFICIENCY: ICD-10-CM

## 2024-01-06 LAB
ALBUMIN SERPL BCP-MCNC: 4.6 G/DL (ref 3.5–5)
ALP SERPL-CCNC: 71 U/L (ref 34–104)
ALT SERPL W P-5'-P-CCNC: 22 U/L (ref 7–52)
ANION GAP SERPL CALCULATED.3IONS-SCNC: 2 MMOL/L
AST SERPL W P-5'-P-CCNC: 15 U/L (ref 13–39)
BILIRUB SERPL-MCNC: 0.45 MG/DL (ref 0.2–1)
BUN SERPL-MCNC: 13 MG/DL (ref 5–25)
CALCIUM SERPL-MCNC: 9.9 MG/DL (ref 8.4–10.2)
CHLORIDE SERPL-SCNC: 105 MMOL/L (ref 96–108)
CHOLEST SERPL-MCNC: 208 MG/DL
CO2 SERPL-SCNC: 31 MMOL/L (ref 21–32)
CREAT SERPL-MCNC: 0.89 MG/DL (ref 0.6–1.3)
GFR SERPL CREATININE-BSD FRML MDRD: 103 ML/MIN/1.73SQ M
GLUCOSE P FAST SERPL-MCNC: 106 MG/DL (ref 65–99)
HDLC SERPL-MCNC: 35 MG/DL
LDLC SERPL CALC-MCNC: 144 MG/DL (ref 0–100)
NONHDLC SERPL-MCNC: 173 MG/DL
POTASSIUM SERPL-SCNC: 4.7 MMOL/L (ref 3.5–5.3)
PROT SERPL-MCNC: 7.3 G/DL (ref 6.4–8.4)
SODIUM SERPL-SCNC: 138 MMOL/L (ref 135–147)
TRIGL SERPL-MCNC: 143 MG/DL
TSH SERPL DL<=0.05 MIU/L-ACNC: 5.73 UIU/ML (ref 0.45–4.5)

## 2024-01-06 PROCEDURE — 36415 COLL VENOUS BLD VENIPUNCTURE: CPT

## 2024-01-06 PROCEDURE — 80053 COMPREHEN METABOLIC PANEL: CPT

## 2024-01-06 PROCEDURE — 84439 ASSAY OF FREE THYROXINE: CPT

## 2024-01-06 PROCEDURE — 80061 LIPID PANEL: CPT

## 2024-01-06 PROCEDURE — 82306 VITAMIN D 25 HYDROXY: CPT

## 2024-01-06 PROCEDURE — 84443 ASSAY THYROID STIM HORMONE: CPT

## 2024-01-07 LAB
25(OH)D3 SERPL-MCNC: 17.7 NG/ML (ref 30–100)
T4 FREE SERPL-MCNC: 0.81 NG/DL (ref 0.61–1.12)

## 2024-01-08 ENCOUNTER — CONSULT (OUTPATIENT)
Dept: GASTROENTEROLOGY | Facility: CLINIC | Age: 44
End: 2024-01-08
Payer: COMMERCIAL

## 2024-01-08 VITALS
SYSTOLIC BLOOD PRESSURE: 122 MMHG | HEIGHT: 70 IN | BODY MASS INDEX: 28 KG/M2 | WEIGHT: 195.6 LBS | DIASTOLIC BLOOD PRESSURE: 82 MMHG | HEART RATE: 80 BPM

## 2024-01-08 DIAGNOSIS — K92.1 HEMATOCHEZIA: Primary | ICD-10-CM

## 2024-01-08 DIAGNOSIS — Z12.12 SCREENING FOR COLORECTAL CANCER: ICD-10-CM

## 2024-01-08 DIAGNOSIS — K90.0 CELIAC DISEASE: ICD-10-CM

## 2024-01-08 DIAGNOSIS — Z12.11 SCREENING FOR COLORECTAL CANCER: ICD-10-CM

## 2024-01-08 PROCEDURE — 99244 OFF/OP CNSLTJ NEW/EST MOD 40: CPT | Performed by: INTERNAL MEDICINE

## 2024-01-08 NOTE — H&P (VIEW-ONLY)
[facility] - Outpatient Note  Jun Simpson 43 y.o. male MRN: [id number]  Encounter: [id number]      ASSESSMENT AND PLAN:    Jun Simpson is a 43 y.o. old pleasant male with PMH of celiac disease, hypothyroidism, anxiety/depression who presents for consultation for celiac disease, hematochezia and colon cancer screening    Colon cancer screening, hematochezia-fortunately patient's hematochezia has resolved.  It appears likely outlet bleeding from hemorrhoids.  He states he had colonoscopy 2 years ago in [place] for bleeding and it appears that he found internal hemorrhoids.  They recommended repeat in 2 years for routine screening.  No family hx ofcolon cancer.  Plan for colonoscopy no biopsy for microscopic colitis    Celiac disease, bloating-I suspect his bloating is from the celiac disease.  He appears to not be fully compliant with a gluten-free diet.  I do not have his previous endoscopic results to confirm that he actually does have celiac disease.  Plan for EGD with biopsies for H. pylori and celiac disease    GERD-intermittent symptoms controlled on intermittent Prilosec.  Evaluate with EGD as above  Okay to use pepcid and stop PPI  GERD lifestyle changes discussed, including avoidance of trigger foods (potential foods include coffee, caffeine, chocolate, mint, tomato-based products, spicy foods, fatty foods), avoid tight fitting clothing, elevated head of bed 30 degrees, avoid eating 2-3 hours prior to bedtime, weight loss, avoid alcohol, avoid tobacco use.      Constipation-patient with bowel movement daily however with incomplete evacuation.  He states he is tried Senokot and Colace however it gave him diarrhea  Trial over-the-counter fiber.  I recommended psyllium fiber 5 g daily      1. Screening for colorectal cancer    - Ambulatory referral to Gastroenterology  - Colonoscopy; Future    2. Hematochezia    - Colonoscopy; Future    3. Celiac disease    - EGD; Future  - Celiac  Disease Antibody Profile; Future    ______________________________________________________________________    SUBJECTIVE: Patient states he had colonoscopy 2 years ago and they recommended repeat in 2 years.  He states underwent colonoscopy in [place] for rectal bleeding and they recommended repeat in 2 years just for his routine screening.  He otherwise states his main issues are significant bloating and he believes he is constipated.  He has a bowel movement twice a day however this associate with straining and incomplete evacuation.      I reviewed prior external notes    I reviewed previous lab results and images      REVIEW OF SYSTEMS:     REVIEW OF ALL OTHER SYSTEMS IS OTHERWISE NEGATIVE.      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     Past Surgical History:   Procedure Laterality Date    APPENDECTOMY       Social History   Social History     Substance and Sexual Activity   Alcohol Use Never     Social History     Substance and Sexual Activity   Drug Use Never     Social History     Tobacco Use   Smoking Status Some Days    Current packs/day: 0.25    Average packs/day: 0.3 packs/day for 31.0 years (7.8 ttl pk-yrs)    Types: Cigarettes    Start date: 1993    Passive exposure: Yes   Smokeless Tobacco Never     Family History   Problem Relation Age of Onset    Alzheimer's disease Mother     Hypothyroidism Mother     No Known Problems Father        Meds/Allergies       Current Outpatient Medications:     ARIPiprazole (ABILIFY) 15 mg tablet    ergocalciferol (VITAMIN D2) 50,000 units    gabapentin (NEURONTIN) 800 mg tablet    hydrOXYzine pamoate (VISTARIL) 50 mg capsule    levothyroxine (Euthyrox) 75 mcg tablet    omeprazole (PriLOSEC) 40 MG capsule    PARoxetine (PAXIL) 20 mg tablet    PARoxetine (PAXIL) 40 MG tablet    prazosin (MINIPRESS) 5 mg capsule     "propranolol (INDERAL) 20 mg tablet    rosuvastatin (CRESTOR) 5 mg tablet    Stimulant Laxative 8.6-50 MG per tablet    Allergies   Allergen Reactions    Bee Venom Eye Swelling and Facial Swelling    Other Throat Swelling     mushrooms           Objective     Blood pressure 122/82, pulse 80, height 5' 10\" (1.778 m), weight 88.7 kg (195 lb 9.6 oz). Body mass index is 28.07 kg/m².      PHYSICAL EXAM:      General Appearance:   Alert, cooperative, no distress   HEENT:   Normocephalic, atraumatic, anicteric.     Neck:  Supple, symmetrical, trachea midline   Lungs:   Clear to auscultation bilaterally; no rales, rhonchi or wheezing; respirations unlabored    Heart::   Regular rate and rhythm; no murmur, rub, or gallop.   Abdomen:   Soft, non-tender, non-distended; normal bowel sounds; no masses, no organomegaly    Genitalia:   Deferred    Rectal:   Deferred    Extremities:  No cyanosis, clubbing or edema    Pulses:  2+ and symmetric    Skin:  No jaundice, rashes, or lesions    Lymph nodes:  No palpable cervical lymphadenopathy        Lab Results:   No visits with results within 1 Day(s) from this visit.   Latest known visit with results is:   Appointment on 01/06/2024   Component Date Value    TSH 3RD GENERATON 01/06/2024 5.732 (H)     Cholesterol 01/06/2024 208 (H)     Triglycerides 01/06/2024 143     HDL, Direct 01/06/2024 35 (L)     LDL Calculated 01/06/2024 144 (H)     Non-HDL-Chol (CHOL-HDL) 01/06/2024 173     Sodium 01/06/2024 138     Potassium 01/06/2024 4.7     Chloride 01/06/2024 105     CO2 01/06/2024 31     ANION GAP 01/06/2024 2     BUN 01/06/2024 13     Creatinine 01/06/2024 0.89     Glucose, Fasting 01/06/2024 106 (H)     Calcium 01/06/2024 9.9     AST 01/06/2024 15     ALT 01/06/2024 22     Alkaline Phosphatase 01/06/2024 71     Total Protein 01/06/2024 7.3     Albumin 01/06/2024 4.6     Total Bilirubin 01/06/2024 0.45     eGFR 01/06/2024 103     Vit D, 25-Hydroxy 01/06/2024 17.7 (L)     Free T4 01/06/2024 " 0.81          Radiology Results:   No results found.

## 2024-01-08 NOTE — PATIENT INSTRUCTIONS
Scheduled date of EGD/colonoscopy (as of today):1/18/24  Physician performing EGD/colonoscopy:Prasad  Location of EGD/colonoscopy:[facility]  Desired bowel prep reviewed with patient:miralax/dulcolax  Instructions reviewed with patient by:Maggie BURLESON  Clearances:   none    Pt aware it will be afternoon appt

## 2024-01-08 NOTE — PROGRESS NOTES
[facility] - Outpatient Note  Jun Simpson 43 y.o. male MRN: [id number]  Encounter: [id number]      ASSESSMENT AND PLAN:    Jun Simpson is a 43 y.o. old pleasant male with PMH of celiac disease, hypothyroidism, anxiety/depression who presents for consultation for celiac disease, hematochezia and colon cancer screening    Colon cancer screening, hematochezia-fortunately patient's hematochezia has resolved.  It appears likely outlet bleeding from hemorrhoids.  He states he had colonoscopy 2 years ago in [place] for bleeding and it appears that he found internal hemorrhoids.  They recommended repeat in 2 years for routine screening.  No family hx ofcolon cancer.  Plan for colonoscopy no biopsy for microscopic colitis    Celiac disease, bloating-I suspect his bloating is from the celiac disease.  He appears to not be fully compliant with a gluten-free diet.  I do not have his previous endoscopic results to confirm that he actually does have celiac disease.  Plan for EGD with biopsies for H. pylori and celiac disease    GERD-intermittent symptoms controlled on intermittent Prilosec.  Evaluate with EGD as above  Okay to use pepcid and stop PPI  GERD lifestyle changes discussed, including avoidance of trigger foods (potential foods include coffee, caffeine, chocolate, mint, tomato-based products, spicy foods, fatty foods), avoid tight fitting clothing, elevated head of bed 30 degrees, avoid eating 2-3 hours prior to bedtime, weight loss, avoid alcohol, avoid tobacco use.      Constipation-patient with bowel movement daily however with incomplete evacuation.  He states he is tried Senokot and Colace however it gave him diarrhea  Trial over-the-counter fiber.  I recommended psyllium fiber 5 g daily      1. Screening for colorectal cancer    - Ambulatory referral to Gastroenterology  - Colonoscopy; Future    2. Hematochezia    - Colonoscopy; Future    3. Celiac disease    - EGD; Future  - Celiac  Disease Antibody Profile; Future    ______________________________________________________________________    SUBJECTIVE: Patient states he had colonoscopy 2 years ago and they recommended repeat in 2 years.  He states underwent colonoscopy in [place] for rectal bleeding and they recommended repeat in 2 years just for his routine screening.  He otherwise states his main issues are significant bloating and he believes he is constipated.  He has a bowel movement twice a day however this associate with straining and incomplete evacuation.      I reviewed prior external notes    I reviewed previous lab results and images      REVIEW OF SYSTEMS:     REVIEW OF ALL OTHER SYSTEMS IS OTHERWISE NEGATIVE.      Historical Information   Past Medical History:   Diagnosis Date    Anxiety     Depression 2013    Taking medication    Disease of thyroid gland     Heart murmur [id number]    I was born with it     Past Surgical History:   Procedure Laterality Date    APPENDECTOMY       Social History   Social History     Substance and Sexual Activity   Alcohol Use Never     Social History     Substance and Sexual Activity   Drug Use Never     Social History     Tobacco Use   Smoking Status Some Days    Current packs/day: 0.25    Average packs/day: 0.3 packs/day for 31.0 years (7.8 ttl pk-yrs)    Types: Cigarettes    Start date: 1993    Passive exposure: Yes   Smokeless Tobacco Never     Family History   Problem Relation Age of Onset    Alzheimer's disease Mother     Hypothyroidism Mother     No Known Problems Father        Meds/Allergies       Current Outpatient Medications:     ARIPiprazole (ABILIFY) 15 mg tablet    ergocalciferol (VITAMIN D2) 50,000 units    gabapentin (NEURONTIN) 800 mg tablet    hydrOXYzine pamoate (VISTARIL) 50 mg capsule    levothyroxine (Euthyrox) 75 mcg tablet    omeprazole (PriLOSEC) 40 MG capsule    PARoxetine (PAXIL) 20 mg tablet    PARoxetine (PAXIL) 40 MG tablet    prazosin (MINIPRESS) 5 mg capsule     "propranolol (INDERAL) 20 mg tablet    rosuvastatin (CRESTOR) 5 mg tablet    Stimulant Laxative 8.6-50 MG per tablet    Allergies   Allergen Reactions    Bee Venom Eye Swelling and Facial Swelling    Other Throat Swelling     mushrooms           Objective     Blood pressure 122/82, pulse 80, height 5' 10\" (1.778 m), weight 88.7 kg (195 lb 9.6 oz). Body mass index is 28.07 kg/m².      PHYSICAL EXAM:      General Appearance:   Alert, cooperative, no distress   HEENT:   Normocephalic, atraumatic, anicteric.     Neck:  Supple, symmetrical, trachea midline   Lungs:   Clear to auscultation bilaterally; no rales, rhonchi or wheezing; respirations unlabored    Heart::   Regular rate and rhythm; no murmur, rub, or gallop.   Abdomen:   Soft, non-tender, non-distended; normal bowel sounds; no masses, no organomegaly    Genitalia:   Deferred    Rectal:   Deferred    Extremities:  No cyanosis, clubbing or edema    Pulses:  2+ and symmetric    Skin:  No jaundice, rashes, or lesions    Lymph nodes:  No palpable cervical lymphadenopathy        Lab Results:   No visits with results within 1 Day(s) from this visit.   Latest known visit with results is:   Appointment on 01/06/2024   Component Date Value    TSH 3RD GENERATON 01/06/2024 5.732 (H)     Cholesterol 01/06/2024 208 (H)     Triglycerides 01/06/2024 143     HDL, Direct 01/06/2024 35 (L)     LDL Calculated 01/06/2024 144 (H)     Non-HDL-Chol (CHOL-HDL) 01/06/2024 173     Sodium 01/06/2024 138     Potassium 01/06/2024 4.7     Chloride 01/06/2024 105     CO2 01/06/2024 31     ANION GAP 01/06/2024 2     BUN 01/06/2024 13     Creatinine 01/06/2024 0.89     Glucose, Fasting 01/06/2024 106 (H)     Calcium 01/06/2024 9.9     AST 01/06/2024 15     ALT 01/06/2024 22     Alkaline Phosphatase 01/06/2024 71     Total Protein 01/06/2024 7.3     Albumin 01/06/2024 4.6     Total Bilirubin 01/06/2024 0.45     eGFR 01/06/2024 103     Vit D, 25-Hydroxy 01/06/2024 17.7 (L)     Free T4 01/06/2024 " 0.81          Radiology Results:   No results found.

## 2024-01-09 ENCOUNTER — OFFICE VISIT (OUTPATIENT)
Dept: FAMILY MEDICINE CLINIC | Facility: CLINIC | Age: 44
End: 2024-01-09
Payer: COMMERCIAL

## 2024-01-09 VITALS
WEIGHT: 195.8 LBS | TEMPERATURE: 98.3 F | HEIGHT: 70 IN | DIASTOLIC BLOOD PRESSURE: 80 MMHG | BODY MASS INDEX: 28.03 KG/M2 | HEART RATE: 65 BPM | SYSTOLIC BLOOD PRESSURE: 110 MMHG | OXYGEN SATURATION: 98 %

## 2024-01-09 DIAGNOSIS — E55.9 VITAMIN D DEFICIENCY: ICD-10-CM

## 2024-01-09 DIAGNOSIS — F41.9 ANXIETY AND DEPRESSION: ICD-10-CM

## 2024-01-09 DIAGNOSIS — F43.10 PTSD (POST-TRAUMATIC STRESS DISORDER): ICD-10-CM

## 2024-01-09 DIAGNOSIS — E03.9 ACQUIRED HYPOTHYROIDISM: Primary | ICD-10-CM

## 2024-01-09 DIAGNOSIS — K21.9 GASTROESOPHAGEAL REFLUX DISEASE WITHOUT ESOPHAGITIS: ICD-10-CM

## 2024-01-09 DIAGNOSIS — F32.A ANXIETY AND DEPRESSION: ICD-10-CM

## 2024-01-09 DIAGNOSIS — E78.2 MIXED HYPERLIPIDEMIA: ICD-10-CM

## 2024-01-09 PROCEDURE — 99214 OFFICE O/P EST MOD 30 MIN: CPT | Performed by: NURSE PRACTITIONER

## 2024-01-09 RX ORDER — LEVOTHYROXINE SODIUM 88 UG/1
88 TABLET ORAL
Qty: 90 TABLET | Refills: 0 | Status: SHIPPED | OUTPATIENT
Start: 2024-01-09

## 2024-01-09 RX ORDER — PRAZOSIN HYDROCHLORIDE 5 MG/1
5 CAPSULE ORAL DAILY
Qty: 90 CAPSULE | Refills: 1 | Status: SHIPPED | OUTPATIENT
Start: 2024-01-09

## 2024-01-09 RX ORDER — PAROXETINE HYDROCHLORIDE 40 MG/1
40 TABLET, FILM COATED ORAL EVERY MORNING
Qty: 90 TABLET | Refills: 1 | Status: SHIPPED | OUTPATIENT
Start: 2024-01-09

## 2024-01-09 RX ORDER — PROPRANOLOL HYDROCHLORIDE 20 MG/1
20 TABLET ORAL EVERY EVENING
Qty: 90 TABLET | Refills: 1 | Status: SHIPPED | OUTPATIENT
Start: 2024-01-09

## 2024-01-09 RX ORDER — OMEPRAZOLE 40 MG/1
40 CAPSULE, DELAYED RELEASE ORAL DAILY
Qty: 90 CAPSULE | Refills: 1 | Status: SHIPPED | OUTPATIENT
Start: 2024-01-09

## 2024-01-09 RX ORDER — ERGOCALCIFEROL 1.25 MG/1
50000 CAPSULE ORAL 2 TIMES WEEKLY
Qty: 24 CAPSULE | Refills: 0 | Status: SHIPPED | OUTPATIENT
Start: 2024-01-11 | End: 2024-04-02

## 2024-01-09 RX ORDER — HYDROXYZINE PAMOATE 50 MG/1
50 CAPSULE ORAL 3 TIMES DAILY PRN
Qty: 90 CAPSULE | Refills: 0 | Status: SHIPPED | OUTPATIENT
Start: 2024-01-09

## 2024-01-09 NOTE — ASSESSMENT & PLAN NOTE
Recent blood work reviewed with patient.  At this time we will increase levothyroxine from 75 mcg to 88 mcg.  Reiterated with patient to make sure he is taking levothyroxine first in the morning, on empty stomach, with water, and 30 minutes prior to any other food or medication.  To recheck levels in 6 to 8 weeks.

## 2024-01-09 NOTE — ASSESSMENT & PLAN NOTE
Patient states after starting Crestor, developed cramping to side and urinary symptoms.  Patient states after stopping statin, symptoms resolved 1 to 2 weeks later.  Discussed with patient alternative medication, but does not wish to start any additional medication at this time.  As per patient, was going to start fiber supplements, concentrate on diet modifications.  Will repeat lipid panel in 3 months.

## 2024-01-09 NOTE — LETTER
Jun Simpson is under my care.  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    If you have any questions or concerns, please don't hesitate to call.        NAY Mark  [facility]

## 2024-01-09 NOTE — PROGRESS NOTES
Name: Jun Simpson      : 1980      MRN: [id number]  Encounter Provider: NAY Perkins  Encounter Date: 2024   Encounter department: [facility] [address]    Assessment & Plan     1. Acquired hypothyroidism  Assessment & Plan:  Recent blood work reviewed with patient.  At this time we will increase levothyroxine from 75 mcg to 88 mcg.  Reiterated with patient to make sure he is taking levothyroxine first in the morning, on empty stomach, with water, and 30 minutes prior to any other food or medication.  To recheck levels in 6 to 8 weeks.    Orders:  -     levothyroxine (Euthyrox) 88 mcg tablet; Take 1 tablet (88 mcg total) by mouth daily in the early morning  -     TSH, 3rd generation with Free T4 reflex; Future    2. Mixed hyperlipidemia  Assessment & Plan:  Patient states after starting Crestor, developed cramping to side and urinary symptoms.  Patient states after stopping statin, symptoms resolved 1 to 2 weeks later.  Discussed with patient alternative medication, but does not wish to start any additional medication at this time.  As per patient, was going to start fiber supplements, concentrate on diet modifications.  Will repeat lipid panel in 3 months.    Orders:  -     Lipid Panel with Direct LDL reflex; Future; Expected date: 2024    3. Anxiety and depression  Assessment & Plan:  Patient has been unable to obtain psychiatrist due to availability being in [place] in [facility].  Patient currently in process of finding local psychiatrist.  Medications refilled pending establishment with psychiatrist.    Orders:  -     hydrOXYzine pamoate (VISTARIL) 50 mg capsule; Take 1 capsule (50 mg total) by mouth 3 (three) times a day as needed for anxiety  -     PARoxetine (PAXIL) 40 MG tablet; Take 1 tablet (40 mg total) by mouth every morning    4. PTSD (post-traumatic stress disorder)  -     PARoxetine (PAXIL) 40 MG tablet; Take 1 tablet (40 mg total) by  mouth every morning  -     prazosin (MINIPRESS) 5 mg capsule; Take 1 capsule (5 mg total) by mouth daily  -     propranolol (INDERAL) 20 mg tablet; Take 1 tablet (20 mg total) by mouth every evening    5. Gastroesophageal reflux disease without esophagitis  -     omeprazole (PriLOSEC) 40 MG capsule; Take 1 capsule (40 mg total) by mouth daily    6. Vitamin D deficiency  -     ergocalciferol (VITAMIN D2) 50,000 units; Take 1 capsule (50,000 Units total) by mouth 2 (two) times a week for 24 doses           Subjective      Patient presents to the office for 4-month follow-up.  She was initiated on Crestor during last visit.  States he stopped taking medication due to cramping to side of abdomen and urinary symptoms.  Patient states once he stopped medications, 1 to 2 weeks later, symptoms stopped.    Patient recently established with GI.  Has yet to establish to psychiatry due to availability being in [facility].  Attempting to contact local providers through her insurance.  She currently denies chest pain, shortness of breath, palpitations, weakness, headaches.      Review of Systems   Constitutional:  Negative for activity change, appetite change, fatigue and unexpected weight change.   HENT:  Negative for sore throat and trouble swallowing.    Eyes:  Negative for photophobia and visual disturbance.   Respiratory:  Negative for cough, chest tightness and shortness of breath.    Cardiovascular:  Negative for chest pain and palpitations.   Gastrointestinal:  Negative for abdominal pain, nausea and vomiting.   Genitourinary:  Negative for decreased urine volume, dysuria, frequency and urgency.   Musculoskeletal:  Negative for arthralgias and myalgias.   Skin:  Negative for color change and rash.   Neurological:  Negative for dizziness, weakness, light-headedness and headaches.   Psychiatric/Behavioral:  Negative for agitation, dysphoric mood, self-injury and suicidal ideas. The patient is not nervous/anxious.   "      Current Outpatient Medications on File Prior to Visit   Medication Sig    ARIPiprazole (ABILIFY) 15 mg tablet Take 15 mg by mouth daily    gabapentin (NEURONTIN) 800 mg tablet Take 800 mg by mouth 3 (three) times a day    rosuvastatin (CRESTOR) 5 mg tablet Take 1 tablet (5 mg total) by mouth daily    Stimulant Laxative 8.6-50 MG per tablet Take 1 tablet by mouth daily    [DISCONTINUED] ergocalciferol (VITAMIN D2) 50,000 units Take 1 capsule (50,000 Units total) by mouth 2 (two) times a week (Patient taking differently: Take 50,000 Units by mouth once a week)    [DISCONTINUED] hydrOXYzine pamoate (VISTARIL) 50 mg capsule TAKE 1 CAPSULE BY MOUTH THREE TIMES DAILY AS NEEDED FOR ANXIETY    [DISCONTINUED] levothyroxine (Euthyrox) 75 mcg tablet Take 1 tablet (75 mcg total) by mouth daily in the early morning    [DISCONTINUED] omeprazole (PriLOSEC) 40 MG capsule Take 40 mg by mouth daily    [DISCONTINUED] PARoxetine (PAXIL) 20 mg tablet Take 20 mg by mouth daily Takes with 40 mg tablet every morning    [DISCONTINUED] PARoxetine (PAXIL) 40 MG tablet TAKE 1 TABLET BY MOUTH EVERY MORNING WITH 20MG TABLET    [DISCONTINUED] prazosin (MINIPRESS) 5 mg capsule Take 5 mg by mouth daily    [DISCONTINUED] propranolol (INDERAL) 20 mg tablet Take 20 mg by mouth every evening       Objective     /80 (BP Location: Left arm, Patient Position: Sitting, Cuff Size: Standard)   Pulse 65   Temp 98.3 °F (36.8 °C) (Tympanic)   Ht 5' 10\" (1.778 m)   Wt 88.8 kg (195 lb 12.8 oz)   SpO2 98%   BMI 28.09 kg/m²     Physical Exam  Vitals reviewed.   Constitutional:       General: He is not in acute distress.     Appearance: Normal appearance. He is not ill-appearing.   HENT:      Head: Normocephalic and atraumatic.      Right Ear: Tympanic membrane, ear canal and external ear normal.      Left Ear: Tympanic membrane, ear canal and external ear normal.      Nose: Nose normal.      Mouth/Throat:      Mouth: Mucous membranes are moist.    "   Pharynx: Oropharynx is clear.   Eyes:      Conjunctiva/sclera: Conjunctivae normal.      Pupils: Pupils are equal, round, and reactive to light.   Cardiovascular:      Rate and Rhythm: Normal rate and regular rhythm.      Pulses: Normal pulses.      Heart sounds: Normal heart sounds. No murmur heard.  Pulmonary:      Effort: Pulmonary effort is normal.      Breath sounds: Normal breath sounds.   Abdominal:      General: Bowel sounds are normal.      Palpations: Abdomen is soft.      Tenderness: There is no abdominal tenderness.   Musculoskeletal:         General: Normal range of motion.      Cervical back: Normal range of motion and neck supple.   Skin:     General: Skin is warm and dry.   Neurological:      General: No focal deficit present.      Mental Status: He is alert and oriented to person, place, and time.   Psychiatric:         Mood and Affect: Mood normal.         Behavior: Behavior normal.       NAY Perkins

## 2024-01-09 NOTE — ASSESSMENT & PLAN NOTE
Patient has been unable to obtain psychiatrist due to availability being in [place] in [facility].  Patient currently in process of finding local psychiatrist.  Medications refilled pending establishment with psychiatrist.

## 2024-01-17 ENCOUNTER — TELEPHONE (OUTPATIENT)
Age: 44
End: 2024-01-17

## 2024-01-17 NOTE — TELEPHONE ENCOUNTER
Scheduled date of EGD/colonoscopy (as of today): 02/06/2024    Physician performing EGD/colonoscopy: Prasad    Location of EGD/colonoscopy: [facility]    Desired bowel prep reviewed with patient: Miralax    Instructions reviewed with patient by: DORI 1/8/24  Confirmed w/ pt prep remains the same    Clearances: None    Pt rescheduled from 1/18/24 due to needing to leave [place] for 2 weeks.

## 2024-02-06 ENCOUNTER — HOSPITAL ENCOUNTER (OUTPATIENT)
Dept: GASTROENTEROLOGY | Facility: HOSPITAL | Age: 44
Setting detail: OUTPATIENT SURGERY
Discharge: HOME | End: 2024-02-06
Attending: INTERNAL MEDICINE
Payer: COMMERCIAL

## 2024-02-06 ENCOUNTER — ANESTHESIA EVENT (OUTPATIENT)
Dept: GASTROENTEROLOGY | Facility: HOSPITAL | Age: 44
End: 2024-02-06

## 2024-02-06 ENCOUNTER — ANESTHESIA (OUTPATIENT)
Dept: GASTROENTEROLOGY | Facility: HOSPITAL | Age: 44
End: 2024-02-06

## 2024-02-06 VITALS
HEIGHT: 67 IN | RESPIRATION RATE: 20 BRPM | TEMPERATURE: 98.2 F | BODY MASS INDEX: 30.1 KG/M2 | OXYGEN SATURATION: 98 % | WEIGHT: 191.8 LBS | HEART RATE: 69 BPM | DIASTOLIC BLOOD PRESSURE: 60 MMHG | SYSTOLIC BLOOD PRESSURE: 95 MMHG

## 2024-02-06 DIAGNOSIS — K92.1 HEMATOCHEZIA: ICD-10-CM

## 2024-02-06 DIAGNOSIS — Z12.12 SCREENING FOR COLORECTAL CANCER: ICD-10-CM

## 2024-02-06 DIAGNOSIS — Z12.11 SCREENING FOR COLORECTAL CANCER: ICD-10-CM

## 2024-02-06 DIAGNOSIS — K90.0 CELIAC DISEASE: ICD-10-CM

## 2024-02-06 PROCEDURE — 43239 EGD BIOPSY SINGLE/MULTIPLE: CPT | Performed by: INTERNAL MEDICINE

## 2024-02-06 PROCEDURE — 45380 COLONOSCOPY AND BIOPSY: CPT | Performed by: INTERNAL MEDICINE

## 2024-02-06 PROCEDURE — 88305 TISSUE EXAM BY PATHOLOGIST: CPT | Performed by: PATHOLOGY

## 2024-02-06 RX ORDER — PROPOFOL 10 MG/ML
INJECTION, EMULSION INTRAVENOUS AS NEEDED
Status: DISCONTINUED | OUTPATIENT
Start: 2024-02-06 | End: 2024-02-06

## 2024-02-06 RX ORDER — SODIUM CHLORIDE, SODIUM LACTATE, POTASSIUM CHLORIDE, CALCIUM CHLORIDE 600; 310; 30; 20 MG/100ML; MG/100ML; MG/100ML; MG/100ML
INJECTION, SOLUTION INTRAVENOUS CONTINUOUS PRN
Status: DISCONTINUED | OUTPATIENT
Start: 2024-02-06 | End: 2024-02-06

## 2024-02-06 RX ORDER — LIDOCAINE HYDROCHLORIDE 20 MG/ML
INJECTION, SOLUTION EPIDURAL; INFILTRATION; INTRACAUDAL; PERINEURAL AS NEEDED
Status: DISCONTINUED | OUTPATIENT
Start: 2024-02-06 | End: 2024-02-06

## 2024-02-06 RX ADMIN — PROPOFOL 200 MG: 10 INJECTION, EMULSION INTRAVENOUS at 09:26

## 2024-02-06 RX ADMIN — LIDOCAINE HYDROCHLORIDE 100 MG: 20 INJECTION, SOLUTION EPIDURAL; INFILTRATION; INTRACAUDAL; PERINEURAL at 09:26

## 2024-02-06 RX ADMIN — PROPOFOL 50 MG: 10 INJECTION, EMULSION INTRAVENOUS at 09:28

## 2024-02-06 RX ADMIN — PROPOFOL 50 MG: 10 INJECTION, EMULSION INTRAVENOUS at 09:31

## 2024-02-06 RX ADMIN — SODIUM CHLORIDE, SODIUM LACTATE, POTASSIUM CHLORIDE, AND CALCIUM CHLORIDE: .6; .31; .03; .02 INJECTION, SOLUTION INTRAVENOUS at 09:20

## 2024-02-06 NOTE — ANESTHESIA POSTPROCEDURE EVALUATION
Post-Op Assessment Note    CV Status:  Stable  Pain Score: 0    Pain management: adequate       Mental Status:  Sleepy   Hydration Status:  Euvolemic   PONV Controlled:  Controlled   Airway Patency:  Patent     Post Op Vitals Reviewed: Yes    No anethesia notable event occurred.    Staff: CRNA               BP (!) 89/55 (02/06/24 0944)    Temp 98.2 °F (36.8 °C) (02/06/24 0944)    Pulse 60 (02/06/24 0944)   Resp 14 (02/06/24 0944)    SpO2 100 % (02/06/24 0944)

## 2024-02-06 NOTE — ANESTHESIA PREPROCEDURE EVALUATION
Procedure:  COLONOSCOPY  EGD    Relevant Problems   CARDIO   (+) Mixed hyperlipidemia      ENDO   (+) Acquired hypothyroidism      GI/HEPATIC   (+) Gastroesophageal reflux disease without esophagitis      NEURO/PSYCH   (+) Anxiety and depression   (+) PTSD (post-traumatic stress disorder)        Physical Exam    Airway    Mallampati score: II  TM Distance: >3 FB  Neck ROM: full     Dental   No notable dental hx     Cardiovascular      Pulmonary      Other Findings      Anesthesia Plan  ASA Score- 2     Anesthesia Type- IV sedation with anesthesia with ASA Monitors.         Additional Monitors:     Airway Plan:     Comment: I have seen the patient and reviewed the history.  Patient to receive IV sedation with full ASA monitors.  Risks discussed with the patient, consent signed.  .       Plan Factors-Exercise tolerance (METS): >4 METS.    Chart reviewed.    Patient summary reviewed.                  Induction- intravenous.    Postoperative Plan-     Informed Consent- Anesthetic plan and risks discussed with patient.  I personally reviewed this patient with the CRNA. Discussed and agreed on the Anesthesia Plan with the CRNA..

## 2024-02-06 NOTE — INTERVAL H&P NOTE
H&P reviewed. After examining the patient I find no changes in the patients condition since the H&P had been written.    Vitals:    02/06/24 0907   BP: 116/76   Pulse: 73   Resp: 20   Temp: 97.6 °F (36.4 °C)   SpO2: 99%

## 2024-02-08 ENCOUNTER — TELEPHONE (OUTPATIENT)
Age: 44
End: 2024-02-08

## 2024-02-08 ENCOUNTER — PATIENT MESSAGE (OUTPATIENT)
Age: 44
End: 2024-02-08

## 2024-02-08 DIAGNOSIS — A04.8 H. PYLORI INFECTION: Primary | ICD-10-CM

## 2024-02-08 PROCEDURE — 88305 TISSUE EXAM BY PATHOLOGIST: CPT | Performed by: PATHOLOGY

## 2024-02-08 RX ORDER — METRONIDAZOLE 500 MG/1
500 TABLET ORAL 4 TIMES DAILY
Qty: 56 TABLET | Refills: 0 | Status: SHIPPED | OUTPATIENT
Start: 2024-02-08 | End: 2024-02-22

## 2024-02-08 RX ORDER — TETRACYCLINE HYDROCHLORIDE 500 MG/1
500 CAPSULE ORAL 4 TIMES DAILY
Qty: 56 CAPSULE | Refills: 0 | Status: SHIPPED | OUTPATIENT
Start: 2024-02-08 | End: 2024-02-22

## 2024-02-08 RX ORDER — BISMUTH SUBSALICYLATE 262 MG/1
524 TABLET, CHEWABLE ORAL 4 TIMES DAILY
Qty: 112 TABLET | Refills: 0 | Status: SHIPPED | OUTPATIENT
Start: 2024-02-08 | End: 2024-02-22

## 2024-02-08 RX ORDER — OMEPRAZOLE 40 MG/1
40 CAPSULE, DELAYED RELEASE ORAL 2 TIMES DAILY
Qty: 28 CAPSULE | Refills: 0 | Status: SHIPPED | OUTPATIENT
Start: 2024-02-08 | End: 2024-02-22

## 2024-02-08 NOTE — TELEPHONE ENCOUNTER
----- Message from Theodore Parham MD sent at 2/8/2024 12:03 PM EST -----  I spoke with patient while biopsy results showing celiac disease and H. pylori.  Can we please call him and explain the medication instructions with repeat stool testing.  Repeat colonoscopy in 1 year for screening with 2-day bowel prep given inadequate bowel prep recently.  Thank you.

## 2024-05-05 DIAGNOSIS — F43.10 PTSD (POST-TRAUMATIC STRESS DISORDER): ICD-10-CM

## 2024-05-05 DIAGNOSIS — F32.A ANXIETY AND DEPRESSION: ICD-10-CM

## 2024-05-05 DIAGNOSIS — A04.8 H. PYLORI INFECTION: ICD-10-CM

## 2024-05-05 DIAGNOSIS — F41.9 ANXIETY AND DEPRESSION: ICD-10-CM

## 2024-05-05 DIAGNOSIS — E55.9 VITAMIN D DEFICIENCY: ICD-10-CM

## 2024-05-05 DIAGNOSIS — E03.9 ACQUIRED HYPOTHYROIDISM: ICD-10-CM

## 2024-05-06 RX ORDER — OMEPRAZOLE 40 MG/1
40 CAPSULE, DELAYED RELEASE ORAL 2 TIMES DAILY
Qty: 28 CAPSULE | Refills: 0 | Status: SHIPPED | OUTPATIENT
Start: 2024-05-06 | End: 2024-05-20

## 2024-05-06 RX ORDER — HYDROXYZINE PAMOATE 50 MG/1
50 CAPSULE ORAL 3 TIMES DAILY PRN
Qty: 90 CAPSULE | Refills: 0 | Status: SHIPPED | OUTPATIENT
Start: 2024-05-06

## 2024-05-06 RX ORDER — LEVOTHYROXINE SODIUM 88 UG/1
88 TABLET ORAL
Qty: 90 TABLET | Refills: 0 | Status: SHIPPED | OUTPATIENT
Start: 2024-05-06

## 2024-05-06 RX ORDER — PRAZOSIN HYDROCHLORIDE 5 MG/1
5 CAPSULE ORAL DAILY
Qty: 90 CAPSULE | Refills: 0 | Status: SHIPPED | OUTPATIENT
Start: 2024-05-06

## 2024-05-06 RX ORDER — ERGOCALCIFEROL 1.25 MG/1
50000 CAPSULE ORAL 2 TIMES WEEKLY
Qty: 24 CAPSULE | Refills: 0 | Status: SHIPPED | OUTPATIENT
Start: 2024-05-06 | End: 2024-07-26

## 2024-05-08 ENCOUNTER — TELEPHONE (OUTPATIENT)
Age: 44
End: 2024-05-08

## 2024-05-08 NOTE — TELEPHONE ENCOUNTER
Can it be clarified if each patient needs a letter or just Jun?  If so, letters can be provided.  Can it be found out if they need  or are in contact with any assistance?  If not, I can place a referral to .  Thank you

## 2024-05-08 NOTE — TELEPHONE ENCOUNTER
He and his family (mom and brother) have been homeless since 4/30/24. Needs letter which states all of their disabilities.  Must also include list of medications, Medicaid insurance, mental health diagnosis, and provide documentation as proof.  Mother is Kandy Renteria and brother is Micah Rebolledo.  All are patient's of NAY aMrk.    Letter should be addressed to:  [address] [address]    Can be emailed to [email or web address] or faxed to [phone]    This is urgently needed.

## 2024-05-08 NOTE — TELEPHONE ENCOUNTER
Called and spoke with Jun and he let me know that each of them need a letter with their disability or diagnosis and medication list attached as well. He said as of right now the only type of services they are trying to get is housing and this company is going to help them with this once the letter is sent. I did advise if they are not able to provide these assistance to reach back out and let us know.    Uncontrolled, however unchanged from visit in July 2017. Patient admits that he did not  his Zyban. Patient therefore has been relying on his chew tobacco for relief of anxiety. However, patient has made headway with this, chewing one to one half cans every 2 days rather than one can per day as previous.    Patient continues to decline referral to psychiatrist or psychologist.    ROS is NEGATIVE for generalized weakness/fatigue, dizziness, vision/hearing changes, chest pain/pressure, palpitations, dyspnea, tachypnea, intense feelings of dread, insomnia, decreased appetite, persistent nausea.

## 2024-05-09 ENCOUNTER — TELEPHONE (OUTPATIENT)
Dept: FAMILY MEDICINE CLINIC | Facility: CLINIC | Age: 44
End: 2024-05-09

## 2024-05-09 NOTE — TELEPHONE ENCOUNTER
Patient called back in regards to the letters he needs. Patient stated he has been doing better with his PTSD but it's been hard as he moving around with his mom and brother. Patient was very understanding and was told that MA (Nory will speak to Elyssa) and hopefully the letters will be completed by tomorrow. thanks

## 2024-05-10 NOTE — TELEPHONE ENCOUNTER
All document requested has been faxed and emailed to [facility] services. Patient is aware. Patient stated that he will also  the original note here in the office.

## 2024-06-11 ENCOUNTER — OFFICE VISIT (OUTPATIENT)
Dept: FAMILY MEDICINE CLINIC | Facility: CLINIC | Age: 44
End: 2024-06-11
Payer: COMMERCIAL

## 2024-06-11 VITALS
RESPIRATION RATE: 18 BRPM | WEIGHT: 204 LBS | SYSTOLIC BLOOD PRESSURE: 140 MMHG | BODY MASS INDEX: 32.02 KG/M2 | HEART RATE: 88 BPM | HEIGHT: 67 IN | DIASTOLIC BLOOD PRESSURE: 90 MMHG | OXYGEN SATURATION: 98 %

## 2024-06-11 DIAGNOSIS — Z00.00 HEALTHCARE MAINTENANCE: ICD-10-CM

## 2024-06-11 DIAGNOSIS — E55.9 VITAMIN D DEFICIENCY: ICD-10-CM

## 2024-06-11 DIAGNOSIS — E03.9 ACQUIRED HYPOTHYROIDISM: ICD-10-CM

## 2024-06-11 DIAGNOSIS — E78.2 MIXED HYPERLIPIDEMIA: ICD-10-CM

## 2024-06-11 DIAGNOSIS — G62.9 NEUROPATHY: ICD-10-CM

## 2024-06-11 DIAGNOSIS — F32.A ANXIETY AND DEPRESSION: ICD-10-CM

## 2024-06-11 DIAGNOSIS — F41.9 ANXIETY AND DEPRESSION: ICD-10-CM

## 2024-06-11 DIAGNOSIS — R73.03 PREDIABETES: ICD-10-CM

## 2024-06-11 DIAGNOSIS — R07.9 CHEST PAIN, UNSPECIFIED TYPE: Primary | ICD-10-CM

## 2024-06-11 DIAGNOSIS — F43.10 PTSD (POST-TRAUMATIC STRESS DISORDER): ICD-10-CM

## 2024-06-11 DIAGNOSIS — R53.83 FATIGUE, UNSPECIFIED TYPE: ICD-10-CM

## 2024-06-11 PROCEDURE — 99214 OFFICE O/P EST MOD 30 MIN: CPT | Performed by: NURSE PRACTITIONER

## 2024-06-11 PROCEDURE — 93000 ELECTROCARDIOGRAM COMPLETE: CPT | Performed by: NURSE PRACTITIONER

## 2024-06-11 RX ORDER — ARIPIPRAZOLE 15 MG/1
15 TABLET ORAL DAILY
Qty: 90 TABLET | Refills: 0 | Status: SHIPPED | OUTPATIENT
Start: 2024-06-11

## 2024-06-11 RX ORDER — ROSUVASTATIN CALCIUM 5 MG/1
5 TABLET, COATED ORAL DAILY
Qty: 90 TABLET | Refills: 0 | Status: SHIPPED | OUTPATIENT
Start: 2024-06-11

## 2024-06-11 RX ORDER — GABAPENTIN 800 MG/1
800 TABLET ORAL 3 TIMES DAILY
Qty: 270 TABLET | Refills: 0 | Status: SHIPPED | OUTPATIENT
Start: 2024-06-11

## 2024-06-11 NOTE — PROGRESS NOTES
Ambulatory Visit  Name: Jun Simpson      : 1980      MRN: [id number]  Encounter Provider: NAY Perkins  Encounter Date: 2024   Encounter department: [facility] [address] [place] [address]    Assessment & Plan   1. Chest pain, unspecified type  Comments:  To obtain blood work and cardiac studies as ordered.  Advised to seek immediate care for worsening symptoms as discussed.  Orders:  -     TSH, 3rd generation with Free T4 reflex; Future  -     Comprehensive metabolic panel; Future  -     CBC and differential; Future  -     POCT ECG  -     Echo complete w/ contrast if indicated; Future; Expected date: 2024  -     Stress test only, exercise; Future; Expected date: 2024  2. Fatigue, unspecified type  Comments:  Discussed importance of self-care, managing mental health, adequate sleep, hydration, nutrition.  To obtain blood work as ordered  Orders:  -     Vitamin D 25 hydroxy; Future  -     TSH, 3rd generation with Free T4 reflex; Future  -     Hemoglobin A1C; Future  -     Comprehensive metabolic panel; Future  -     CBC and differential; Future  -     Testosterone, free, total; Future  3. Anxiety and depression  Assessment & Plan:  Patient was seen behavioral health therapist in this office, but due to change in insurance, has not been able to continue with for therapist.  Patient feels he is stable on current medications.  Per patient, [place] is attempting to find a new BHT and psychiatrist since relocating to [place].  Will continue on current medication as prescribed while a/w mental health placement.  4. Mixed hyperlipidemia  -     rosuvastatin (CRESTOR) 5 mg tablet; Take 1 tablet (5 mg total) by mouth daily  -     Comprehensive metabolic panel; Future  -     Lipid panel; Future  5. Neuropathy  -     gabapentin (NEURONTIN) 800 mg tablet; Take 1 tablet (800 mg total) by mouth 3 (three) times a day  6. PTSD (post-traumatic stress disorder)  -      ARIPiprazole (ABILIFY) 15 mg tablet; Take 1 tablet (15 mg total) by mouth daily  7. Vitamin D deficiency  -     Vitamin D 25 hydroxy; Future  8. Prediabetes  Assessment & Plan:  Denies symptoms related to elevated blood sugars.  To obtain blood work as ordered.  Orders:  -     Hemoglobin A1C; Future  -     Comprehensive metabolic panel; Future  -     CBC and differential; Future  9. Acquired hypothyroidism  -     TSH, 3rd generation with Free T4 reflex; Future  10. Healthcare maintenance  -     Vitamin D 25 hydroxy; Future  -     TSH, 3rd generation with Free T4 reflex; Future  -     Hemoglobin A1C; Future  -     Comprehensive metabolic panel; Future  -     CBC and differential; Future  -     Lipid panel; Future  -     Testosterone, free, total; Future       History of Present Illness     Patient presents to the office accompanied by mother for 3-month follow-up.  Complains of left-sided chest pain.    Has been occurring over the past 6 weeks.    Per patient, has 2 episodes a week of chest pain.    Notes sharp pain to left side of chest and pain in right upper arm.  Has had episodes of dizziness, diaphoresis, shortness of breath with the pain.  Pain is worse at night.  Better with movement.  Patient notes increased stress from recent loss of housing and needing to find new housing.  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  Patient denies palpitations, syncope, visual disturbances, jaw pain, numbness/tingling in extremities.        Review of Systems   Constitutional:  Positive for fatigue. Negative for activity change, appetite change and unexpected weight change.   HENT:  Negative for sore throat, tinnitus and trouble swallowing.    Eyes:  Negative for photophobia and visual disturbance.   Respiratory:  Negative for cough, chest tightness and shortness of breath.    Cardiovascular:  Positive for chest pain. Negative for palpitations and leg swelling.   Gastrointestinal:  Negative for abdominal pain, blood  in stool, nausea and vomiting.   Endocrine: Negative for polydipsia, polyphagia and polyuria.   Genitourinary:  Negative for decreased urine volume.   Musculoskeletal:  Negative for arthralgias and myalgias.   Skin:  Negative for color change and rash.   Neurological:  Negative for facial asymmetry, weakness, light-headedness, numbness and headaches.   Psychiatric/Behavioral:  Negative for agitation and dysphoric mood. The patient is not nervous/anxious.      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     Past Surgical History:   Procedure Laterality Date    APPENDECTOMY      COLONOSCOPY       Family History   Problem Relation Age of Onset    Alzheimer's disease Mother     Hypothyroidism Mother     No Known Problems Father      Current Outpatient Medications on File Prior to Visit   Medication Sig Dispense Refill    ergocalciferol (VITAMIN D2) 50,000 units Take 1 capsule (50,000 Units total) by mouth 2 (two) times a week for 24 doses 24 capsule 0    hydrOXYzine pamoate (VISTARIL) 50 mg capsule Take 1 capsule (50 mg total) by mouth 3 (three) times a day as needed for anxiety 90 capsule 0    levothyroxine (Euthyrox) 88 mcg tablet Take 1 tablet (88 mcg total) by mouth daily in the early morning 90 tablet 0    omeprazole (PriLOSEC) 40 MG capsule Take 1 capsule (40 mg total) by mouth 2 (two) times a day for 14 days 28 capsule 0    prazosin (MINIPRESS) 5 mg capsule Take 1 capsule (5 mg total) by mouth daily 90 capsule 0    propranolol (INDERAL) 20 mg tablet Take 1 tablet (20 mg total) by mouth every evening 90 tablet 1    Stimulant Laxative 8.6-50 MG per tablet Take 1 tablet by mouth daily      [DISCONTINUED] ARIPiprazole  (ABILIFY) 15 mg tablet Take 15 mg by mouth daily      [DISCONTINUED] gabapentin (NEURONTIN) 800 mg tablet Take 800 mg by mouth 3 (three) times a day      [DISCONTINUED] rosuvastatin (CRESTOR) 5 mg tablet Take 1 tablet (5 mg total) by mouth daily 90 tablet 0    PARoxetine (PAXIL) 40 MG tablet Take 1 tablet (40 mg total) by mouth every morning (Patient not taking: Reported on 6/11/2024) 90 tablet 1     No current facility-administered medications on file prior to visit.     Allergies   Allergen Reactions    Bee Venom Eye Swelling and Facial Swelling    Other Throat Swelling     mushrooms      Current Outpatient Medications on File Prior to Visit   Medication Sig Dispense Refill    ergocalciferol (VITAMIN D2) 50,000 units Take 1 capsule (50,000 Units total) by mouth 2 (two) times a week for 24 doses 24 capsule 0    hydrOXYzine pamoate (VISTARIL) 50 mg capsule Take 1 capsule (50 mg total) by mouth 3 (three) times a day as needed for anxiety 90 capsule 0    levothyroxine (Euthyrox) 88 mcg tablet Take 1 tablet (88 mcg total) by mouth daily in the early morning 90 tablet 0    omeprazole (PriLOSEC) 40 MG capsule Take 1 capsule (40 mg total) by mouth 2 (two) times a day for 14 days 28 capsule 0    prazosin (MINIPRESS) 5 mg capsule Take 1 capsule (5 mg total) by mouth daily 90 capsule 0    propranolol (INDERAL) 20 mg tablet Take 1 tablet (20 mg total) by mouth every evening 90 tablet 1    Stimulant Laxative 8.6-50 MG per tablet Take 1 tablet by mouth daily      [DISCONTINUED] ARIPiprazole (ABILIFY) 15 mg tablet Take 15 mg by mouth daily      [DISCONTINUED] gabapentin (NEURONTIN) 800 mg tablet Take 800 mg by mouth 3 (three) times a day      [DISCONTINUED] rosuvastatin (CRESTOR) 5 mg tablet Take 1 tablet (5 mg total) by mouth daily 90 tablet 0    PARoxetine (PAXIL) 40 MG tablet Take 1 tablet (40 mg total) by mouth every morning (Patient not taking: Reported on 6/11/2024) 90 tablet 1     No current facility-administered  "medications on file prior to visit.      Social History     Tobacco Use    Smoking status: Some Days     Current packs/day: 0.25     Average packs/day: 0.3 packs/day for 31.4 years (7.9 ttl pk-yrs)     Types: Cigarettes     Start date: 1993     Passive exposure: Yes    Smokeless tobacco: Never   Vaping Use    Vaping status: Never Used   Substance and Sexual Activity    Alcohol use: Never    Drug use: Never    Sexual activity: Not Currently     Partners: Female     Birth control/protection: None     Objective     /90 (BP Location: Left arm, Patient Position: Sitting)   Pulse 88   Resp 18   Ht 5' 7\" (1.702 m)   Wt 92.5 kg (204 lb)   SpO2 98%   BMI 31.95 kg/m²     Physical Exam  Vitals reviewed.   Constitutional:       General: He is not in acute distress.     Appearance: Normal appearance. He is not ill-appearing.   HENT:      Head: Normocephalic and atraumatic.      Right Ear: Tympanic membrane, ear canal and external ear normal.      Left Ear: Tympanic membrane, ear canal and external ear normal.      Nose: Nose normal.      Mouth/Throat:      Mouth: Mucous membranes are moist.      Pharynx: Oropharynx is clear.   Eyes:      Conjunctiva/sclera: Conjunctivae normal.      Pupils: Pupils are equal, round, and reactive to light.   Neck:      Vascular: No carotid bruit.   Cardiovascular:      Rate and Rhythm: Normal rate and regular rhythm.      Pulses: Normal pulses.      Heart sounds: Normal heart sounds. No murmur heard.     Comments: POCT ECG:  Normal sinus rhythm  Rate 72  Pulmonary:      Effort: Pulmonary effort is normal.      Breath sounds: Normal breath sounds.   Chest:      Chest wall: No tenderness.   Abdominal:      General: Bowel sounds are normal.      Palpations: Abdomen is soft.   Musculoskeletal:      Cervical back: Normal range of motion and neck supple.      Right lower leg: No edema.      Left lower leg: No edema.   Lymphadenopathy:      Cervical: No cervical adenopathy.   Skin:     " General: Skin is warm and dry.   Neurological:      General: No focal deficit present.      Mental Status: He is alert and oriented to person, place, and time.   Psychiatric:         Mood and Affect: Mood normal.         Behavior: Behavior normal.

## 2024-06-11 NOTE — ASSESSMENT & PLAN NOTE
Patient was seen behavioral health therapist in this office, but due to change in insurance, has not been able to continue with for therapist.  Patient feels he is stable on current medications.  Per patient, [facility] is attempting to find a new BHT and psychiatrist since relocating to [place].  Will continue on current medication as prescribed while a/w mental health placement.

## 2024-07-10 ENCOUNTER — HOSPITAL ENCOUNTER (OUTPATIENT)
Dept: NON INVASIVE DIAGNOSTICS | Facility: CLINIC | Age: 44
Discharge: HOME | End: 2024-07-10

## 2024-10-31 ENCOUNTER — OFFICE VISIT (OUTPATIENT)
Dept: FAMILY MEDICINE CLINIC | Facility: CLINIC | Age: 44
End: 2024-10-31
Payer: COMMERCIAL

## 2024-10-31 VITALS
SYSTOLIC BLOOD PRESSURE: 130 MMHG | HEIGHT: 67 IN | BODY MASS INDEX: 32.49 KG/M2 | WEIGHT: 207 LBS | OXYGEN SATURATION: 98 % | DIASTOLIC BLOOD PRESSURE: 84 MMHG | HEART RATE: 74 BPM | RESPIRATION RATE: 18 BRPM

## 2024-10-31 DIAGNOSIS — F41.9 ANXIETY AND DEPRESSION: Primary | ICD-10-CM

## 2024-10-31 DIAGNOSIS — F43.10 PTSD (POST-TRAUMATIC STRESS DISORDER): ICD-10-CM

## 2024-10-31 DIAGNOSIS — G62.9 NEUROPATHY: ICD-10-CM

## 2024-10-31 DIAGNOSIS — E55.9 VITAMIN D DEFICIENCY: ICD-10-CM

## 2024-10-31 DIAGNOSIS — Z00.00 ANNUAL PHYSICAL EXAM: ICD-10-CM

## 2024-10-31 DIAGNOSIS — F32.A ANXIETY AND DEPRESSION: Primary | ICD-10-CM

## 2024-10-31 PROCEDURE — 99396 PREV VISIT EST AGE 40-64: CPT | Performed by: NURSE PRACTITIONER

## 2024-10-31 RX ORDER — PAROXETINE 40 MG/1
40 TABLET, FILM COATED ORAL EVERY MORNING
Qty: 90 TABLET | Refills: 1 | Status: SHIPPED | OUTPATIENT
Start: 2024-10-31

## 2024-10-31 RX ORDER — ARIPIPRAZOLE 15 MG/1
15 TABLET ORAL DAILY
Qty: 90 TABLET | Refills: 1 | Status: SHIPPED | OUTPATIENT
Start: 2024-10-31

## 2024-10-31 RX ORDER — ERGOCALCIFEROL 1.25 MG/1
50000 CAPSULE, LIQUID FILLED ORAL WEEKLY
Qty: 12 CAPSULE | Refills: 1 | Status: SHIPPED | OUTPATIENT
Start: 2024-10-31 | End: 2025-04-11

## 2024-10-31 RX ORDER — GABAPENTIN 800 MG/1
800 TABLET ORAL 3 TIMES DAILY
Qty: 270 TABLET | Refills: 1 | Status: SHIPPED | OUTPATIENT
Start: 2024-10-31

## 2024-10-31 NOTE — ASSESSMENT & PLAN NOTE
Patient has been attempting to get mental health specialist, but is having difficulty due to insurance.  Began seeing [facility] online, but notes it is costly.  Was attempting to get off medications, but states he is having increase in anxiety depression, difficulty with sleep.  Denies SI/HI.  Would like to be restarted on medications.  Paroxetine and Abilify ordered.  Attempted to give patient referrals to behavioral health services in [facility], but states he would like to wait because he is getting new insurance in the new year.  Advised to return in 3 to 4 months, or sooner if needed.    Orders:    PARoxetine (PAXIL) 40 MG tablet; Take 1 tablet (40 mg total) by mouth every morning

## 2024-10-31 NOTE — ASSESSMENT & PLAN NOTE
Orders:    ergocalciferol (VITAMIN D2) 50,000 units; Take 1 capsule (50,000 Units total) by mouth once a week for 24 doses

## 2024-10-31 NOTE — PATIENT INSTRUCTIONS
"Patient Education     Routine physical for adults   The Basics   Written by the doctors and editors at [facility]   What is a physical? -- A physical is a routine visit, or \"check-up,\" with your doctor. You might also hear it called a \"wellness visit\" or \"preventive visit.\"  During each visit, the doctor will:   Ask about your physical and mental health   Ask about your habits, behaviors, and lifestyle   Do an exam   Give you vaccines if needed   Talk to you about any medicines you take   Give advice about your health   Answer your questions  Getting regular check-ups is an important part of taking care of your health. It can help your doctor find and treat any problems you have. But it's also important for preventing health problems.  A routine physical is different from a \"sick visit.\" A sick visit is when you see a doctor because of a health concern or problem. Since physicals are scheduled ahead of time, you can think about what you want to ask the doctor.  How often should I get a physical? -- It depends on your age and health. In general, for people age 21 years and older:   If you are younger than 50 years, you might be able to get a physical every 3 years.   If you are 50 years or older, your doctor might recommend a physical every year.  If you have an ongoing health condition, like diabetes or high blood pressure, your doctor will probably want to see you more often.  What happens during a physical? -- In general, each visit will include:   Physical exam - The doctor or nurse will check your height, weight, heart rate, and blood pressure. They will also look at your eyes and ears. They will ask about how you are feeling and whether you have any symptoms that bother you.   Medicines - It's a good idea to bring a list of all the medicines you take to each doctor visit. Your doctor will talk to you about your medicines and answer any questions. Tell them if you are having any side effects that bother you. You " "should also tell them if you are having trouble paying for any of your medicines.   Habits and behaviors - This includes:   Your diet   Your exercise habits   Whether you smoke, drink alcohol, or use drugs   Whether you are sexually active   Whether you feel safe at home  Your doctor will talk to you about things you can do to improve your health and lower your risk of health problems. They will also offer help and support. For example, if you want to quit smoking, they can give you advice and might prescribe medicines. If you want to improve your diet or get more physical activity, they can help you with this, too.   Lab tests, if needed - The tests you get will depend on your age and situation. For example, your doctor might want to check your:   Cholesterol   Blood sugar   Iron level   Vaccines - The recommended vaccines will depend on your age, health, and what vaccines you already had. Vaccines are very important because they can prevent certain serious or deadly infections.   Discussion of screening - \"Screening\" means checking for diseases or other health problems before they cause symptoms. Your doctor can recommend screening based on your age, risk, and preferences. This might include tests to check for:   Cancer, such as breast, prostate, cervical, ovarian, colorectal, prostate, lung, or skin cancer   Sexually transmitted infections, such as chlamydia and gonorrhea   Mental health conditions like depression and anxiety  Your doctor will talk to you about the different types of screening tests. They can help you decide which screenings to have. They can also explain what the results might mean.   Answering questions - The physical is a good time to ask the doctor or nurse questions about your health. If needed, they can refer you to other doctors or specialists, too.  Adults older than 65 years often need other care, too. As you get older, your doctor will talk to you about:   How to prevent falling at " home   Hearing or vision tests   Memory testing   How to take your medicines safely   Making sure that you have the help and support you need at home  All topics are updated as new evidence becomes available and our peer review process is complete.  This topic retrieved from [employer] on: May 02, 2024.  Topic 146092 Version 1.0  Release: 32.4.3 - C32.122  © 2024 UpToDate, Inc. and/or its affiliates. All rights reserved.  Consumer Information Use and Disclaimer   Disclaimer: This generalized information is a limited summary of diagnosis, treatment, and/or medication information. It is not meant to be comprehensive and should be used as a tool to help the user understand and/or assess potential diagnostic and treatment options. It does NOT include all information about conditions, treatments, medications, side effects, or risks that may apply to a specific patient. It is not intended to be medical advice or a substitute for the medical advice, diagnosis, or treatment of a health care provider based on the health care provider's examination and assessment of a patient's specific and unique circumstances. Patients must speak with a health care provider for complete information about their health, medical questions, and treatment options, including any risks or benefits regarding use of medications. This information does not endorse any treatments or medications as safe, effective, or approved for treating a specific patient. UpToDate, Inc. and its affiliates disclaim any warranty or liability relating to this information or the use thereof.The use of this information is governed by the Terms of Use, available at https://www.[employer].com/en/know/clinical-effectiveness-terms. 2024© UpToDate, Inc. and its affiliates and/or licensors. All rights reserved.  Copyright   © 2024 UpToDate, Inc. and/or its affiliates. All rights reserved.

## 2024-10-31 NOTE — ASSESSMENT & PLAN NOTE
Has been without gabapentin for several months.  Would like to be restarted on medication.  Medication reordered at this time.    Orders:    gabapentin (NEURONTIN) 800 mg tablet; Take 1 tablet (800 mg total) by mouth 3 (three) times a day

## 2024-10-31 NOTE — ASSESSMENT & PLAN NOTE
Orders:    PARoxetine (PAXIL) 40 MG tablet; Take 1 tablet (40 mg total) by mouth every morning    ARIPiprazole (ABILIFY) 15 mg tablet; Take 1 tablet (15 mg total) by mouth daily

## 2024-10-31 NOTE — PROGRESS NOTES
Adult Annual Physical  Name: Jun Simpson      : 1980      MRN: [id number]  Encounter Provider: NAY Perkins  Encounter Date: 10/31/2024   Encounter department: [facility] [address] [facility]    Assessment & Plan  Annual physical exam  Exam completed.  Advised to obtain blood work that was ordered earlier in year.         Anxiety and depression  Patient has been attempting to get mental health specialist, but is having difficulty due to insurance.  Began seeing [facility] online, but notes it is costly.  Was attempting to get off medications, but states he is having increase in anxiety depression, difficulty with sleep.  Denies SI/HI.  Would like to be restarted on medications.  Paroxetine and Abilify ordered.  Attempted to give patient referrals to behavioral health services in [facility], but states he would like to wait because he is getting new insurance in the new year.  Advised to return in 3 to 4 months, or sooner if needed.    Orders:    PARoxetine (PAXIL) 40 MG tablet; Take 1 tablet (40 mg total) by mouth every morning    PTSD (post-traumatic stress disorder)    Orders:    PARoxetine (PAXIL) 40 MG tablet; Take 1 tablet (40 mg total) by mouth every morning    ARIPiprazole (ABILIFY) 15 mg tablet; Take 1 tablet (15 mg total) by mouth daily    Vitamin D deficiency    Orders:    ergocalciferol (VITAMIN D2) 50,000 units; Take 1 capsule (50,000 Units total) by mouth once a week for 24 doses    Neuropathy  Has been without gabapentin for several months.  Would like to be restarted on medication.  Medication reordered at this time.    Orders:    gabapentin (NEURONTIN) 800 mg tablet; Take 1 tablet (800 mg total) by mouth 3 (three) times a day    Immunizations and preventive care screenings were discussed with patient today. Appropriate education was printed on patient's after visit summary.    Discussed risks and benefits of prostate cancer screening. We discussed the  controversial history of PSA screening for prostate cancer in the [place] as well as the risk of over detection and over treatment of prostate cancer by way of PSA screening.  The patient understands that PSA blood testing is an imperfect way to screen for prostate cancer and that elevated PSA levels in the blood may also be caused by infection, inflammation, prostatic trauma or manipulation, urological procedures, or by benign prostatic enlargement.    The role of the digital rectal examination in prostate cancer screening was also discussed and I discussed with him that there is large interobserver variability in the findings of digital rectal examination.    Counseling:  Alcohol/drug use: discussed moderation in alcohol intake, the recommendations for healthy alcohol use, and avoidance of illicit drug use.  Dental Health: discussed importance of regular tooth brushing, flossing, and dental visits.  Injury prevention: discussed safety/seat belts, safety helmets, smoke detectors, carbon monoxide detectors, and smoking near bedding or upholstery.  Sexual health: discussed sexually transmitted diseases, partner selection, use of condoms, avoidance of unintended pregnancy, and contraceptive alternatives.  Exercise: the importance of regular exercise/physical activity was discussed. Recommend exercise 3-5 times per week for at least 30 minutes.       Depression Screening and Follow-up Plan: Patient's depression screening was positive with a PHQ-9 score of 13. Patient with underlying depression and was advised to continue current medications as prescribed.     Tobacco Cessation Counseling: Tobacco cessation counseling was not provided. The patient is sincerely urged to quit consumption of tobacco. He is not ready to quit tobacco.         History of Present Illness     Adult Annual Physical:  Patient presents for annual physical.     Diet and Physical Activity:  - Diet/Nutrition: poor diet.  - Exercise: no formal  exercise.    Depression Screening:    - PHQ-9 Score: 13    General Health:  - Sleep: 4-6 hours of sleep on average.  - Hearing: normal hearing bilateral ears.  - Vision: wears glasses and most recent eye exam > 1 year ago.  - Dental: brushes teeth twice daily.     Health:  - History of STDs: no.   - Urinary symptoms: none.     Advanced Care Planning:  - Has an advanced directive?: no    - Has a durable medical POA?: no    - ACP document given to patient?: no      Review of Systems   Constitutional:  Negative for activity change, appetite change, fatigue and unexpected weight change.   HENT:  Negative for congestion, ear pain, sore throat and trouble swallowing.    Eyes:  Negative for photophobia and visual disturbance.   Respiratory:  Negative for cough, chest tightness and shortness of breath.    Cardiovascular:  Negative for chest pain and palpitations.   Gastrointestinal:  Negative for abdominal pain, blood in stool, constipation, diarrhea, nausea and vomiting.   Endocrine: Negative for polydipsia, polyphagia and polyuria.   Genitourinary:  Negative for decreased urine volume, dysuria, flank pain, frequency, hematuria, testicular pain and urgency.   Musculoskeletal:  Negative for arthralgias and back pain.   Skin:  Negative for color change and rash.   Neurological:  Negative for dizziness, syncope, weakness, light-headedness, numbness and headaches.   Hematological:  Negative for adenopathy. Does not bruise/bleed easily.   Psychiatric/Behavioral:  Positive for sleep disturbance. Negative for agitation, dysphoric mood, self-injury and suicidal ideas. The patient is not nervous/anxious.      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     Past Surgical History:   Procedure Laterality Date    APPENDECTOMY      COLONOSCOPY       Family History   Problem Relation Age of Onset    Alzheimer's disease Mother     Hypothyroidism Mother     No Known Problems Father      Current Outpatient Medications on File Prior to Visit   Medication Sig Dispense Refill    hydrOXYzine pamoate (VISTARIL) 50 mg capsule Take 1 capsule (50 mg total) by mouth 3 (three) times a day as needed for anxiety 90 capsule 0    levothyroxine (Euthyrox) 88 mcg tablet Take 1 tablet (88 mcg total) by mouth daily in the early morning 90 tablet 0    prazosin (MINIPRESS) 5 mg capsule Take 1 capsule (5 mg total) by mouth daily 90 capsule 0    rosuvastatin (CRESTOR) 5 mg tablet Take 1 tablet (5 mg total) by mouth daily 90 tablet 0    [DISCONTINUED] ARIPiprazole (ABILIFY) 15 mg tablet Take 1 tablet (15 mg total) by mouth daily 90 tablet 0    [DISCONTINUED] ergocalciferol (VITAMIN D2) 50,000 units Take 1 capsule (50,000 Units total) by mouth 2 (two) times a week for 24 doses (Patient taking differently: Take 50,000 Units by mouth once a week) 24 capsule 0    [DISCONTINUED] gabapentin (NEURONTIN) 800 mg tablet Take 1 tablet (800 mg total) by mouth 3 (three) times a day 270 tablet 0    propranolol (INDERAL) 20 mg tablet Take 1 tablet (20 mg total) by mouth every evening (Patient not taking: Reported on 10/31/2024) 90 tablet 1    [DISCONTINUED] omeprazole (PriLOSEC) 40 MG capsule Take 1 capsule (40 mg total) by mouth 2 (two) times a day for 14 days 28 capsule 0    [DISCONTINUED] PARoxetine (PAXIL) 40 MG tablet Take 1 tablet (40 mg total) by mouth every morning (Patient not taking: Reported on 6/11/2024) 90 tablet 1    [DISCONTINUED] Stimulant Laxative 8.6-50 MG per tablet Take 1 tablet by mouth daily (Patient not taking: Reported on  10/31/2024)       No current facility-administered medications on file prior to visit.     Allergies   Allergen Reactions    Bee Venom Eye Swelling and Facial Swelling    Other Throat Swelling     mushrooms      Current Outpatient Medications on File Prior to Visit   Medication Sig Dispense Refill    hydrOXYzine pamoate (VISTARIL) 50 mg capsule Take 1 capsule (50 mg total) by mouth 3 (three) times a day as needed for anxiety 90 capsule 0    levothyroxine (Euthyrox) 88 mcg tablet Take 1 tablet (88 mcg total) by mouth daily in the early morning 90 tablet 0    prazosin (MINIPRESS) 5 mg capsule Take 1 capsule (5 mg total) by mouth daily 90 capsule 0    rosuvastatin (CRESTOR) 5 mg tablet Take 1 tablet (5 mg total) by mouth daily 90 tablet 0    [DISCONTINUED] ARIPiprazole (ABILIFY) 15 mg tablet Take 1 tablet (15 mg total) by mouth daily 90 tablet 0    [DISCONTINUED] ergocalciferol (VITAMIN D2) 50,000 units Take 1 capsule (50,000 Units total) by mouth 2 (two) times a week for 24 doses (Patient taking differently: Take 50,000 Units by mouth once a week) 24 capsule 0    [DISCONTINUED] gabapentin (NEURONTIN) 800 mg tablet Take 1 tablet (800 mg total) by mouth 3 (three) times a day 270 tablet 0    propranolol (INDERAL) 20 mg tablet Take 1 tablet (20 mg total) by mouth every evening (Patient not taking: Reported on 10/31/2024) 90 tablet 1    [DISCONTINUED] omeprazole (PriLOSEC) 40 MG capsule Take 1 capsule (40 mg total) by mouth 2 (two) times a day for 14 days 28 capsule 0    [DISCONTINUED] PARoxetine (PAXIL) 40 MG tablet Take 1 tablet (40 mg total) by mouth every morning (Patient not taking: Reported on 6/11/2024) 90 tablet 1    [DISCONTINUED] Stimulant Laxative 8.6-50 MG per tablet Take 1 tablet by mouth daily (Patient not taking: Reported on 10/31/2024)       No current facility-administered medications on file prior to visit.      Social History     Tobacco Use    Smoking status: Some Days     Current packs/day: 0.25      "Average packs/day: 0.3 packs/day for 31.8 years (8.0 ttl pk-yrs)     Types: Cigarettes     Start date: 1993     Passive exposure: Yes    Smokeless tobacco: Never   Vaping Use    Vaping status: Never Used   Substance and Sexual Activity    Alcohol use: Never    Drug use: Never    Sexual activity: Not Currently     Partners: Female     Birth control/protection: None       Objective     /84 (BP Location: Left arm, Patient Position: Sitting)   Pulse 74   Resp 18   Ht 5' 7\" (1.702 m)   Wt 93.9 kg (207 lb)   SpO2 98%   BMI 32.42 kg/m²     Physical Exam  Vitals reviewed.   Constitutional:       General: He is not in acute distress.     Appearance: Normal appearance. He is well-developed. He is not ill-appearing.   HENT:      Head: Normocephalic and atraumatic.      Right Ear: Tympanic membrane, ear canal and external ear normal.      Left Ear: Tympanic membrane, ear canal and external ear normal.      Nose: Nose normal.      Mouth/Throat:      Mouth: Mucous membranes are moist.      Pharynx: Oropharynx is clear.   Eyes:      Extraocular Movements: Extraocular movements intact.      Conjunctiva/sclera: Conjunctivae normal.      Pupils: Pupils are equal, round, and reactive to light.   Cardiovascular:      Rate and Rhythm: Normal rate and regular rhythm.      Pulses: Normal pulses.      Heart sounds: Normal heart sounds. No murmur heard.  Pulmonary:      Effort: Pulmonary effort is normal. No respiratory distress.      Breath sounds: Normal breath sounds.   Abdominal:      General: Bowel sounds are normal.      Palpations: Abdomen is soft.      Tenderness: There is no abdominal tenderness. There is no right CVA tenderness or left CVA tenderness.      Hernia: No hernia is present.   Musculoskeletal:         General: No swelling. Normal range of motion.      Cervical back: Normal range of motion and neck supple.      Right lower leg: No edema.      Left lower leg: No edema.   Lymphadenopathy:      Cervical: No " cervical adenopathy.   Skin:     General: Skin is warm and dry.      Capillary Refill: Capillary refill takes less than 2 seconds.   Neurological:      General: No focal deficit present.      Mental Status: He is alert and oriented to person, place, and time.   Psychiatric:         Attention and Perception: Attention and perception normal. He does not perceive auditory or visual hallucinations.         Mood and Affect: Mood and affect normal.         Behavior: Behavior normal. Behavior is cooperative.         Thought Content: Thought content normal. Thought content does not include homicidal or suicidal ideation. Thought content does not include homicidal or suicidal plan.       Depression Screening Follow-up Plan: Patient's depression screening was positive with a PHQ-2 score of . Their PHQ-9 score was 13. Patient with underlying depression and was advised to continue current medications as prescribed.

## 2024-11-01 ENCOUNTER — TELEPHONE (OUTPATIENT)
Age: 44
End: 2024-11-01

## 2024-11-01 NOTE — TELEPHONE ENCOUNTER
PA for ARIPiprazole (ABILIFY) 15 mg tablet SUBMITTED     via    [x]CMM-KEY: [id number]  []Surescripts-Case ID #   []Availity-Auth ID # NDC #   []Faxed to plan   []Other website   []Phone call Case ID #     Office notes sent, clinical questions answered. Awaiting determination    Turnaround time for your insurance to make a decision on your Prior Authorization can take 7-21 business days.

## 2024-11-01 NOTE — TELEPHONE ENCOUNTER
PA for ARIPiprazole (ABILIFY) 15 mg tablet  NOT REQUIRED     Reason (screenshot if applicable)          Patient advised by          [] MyChart Message  [] Phone call   []LMOM  []L/M to call office as no active Communication consent on file  []Unable to leave detailed message as VM not approved on Communication consent       Pharmacy advised by    [x]Fax  []Phone call

## 2024-11-08 DIAGNOSIS — F43.10 PTSD (POST-TRAUMATIC STRESS DISORDER): ICD-10-CM

## 2024-11-08 RX ORDER — PRAZOSIN HYDROCHLORIDE 5 MG/1
5 CAPSULE ORAL DAILY
Qty: 90 CAPSULE | Refills: 1 | Status: SHIPPED | OUTPATIENT
Start: 2024-11-08

## 2025-04-14 ENCOUNTER — OFFICE VISIT (OUTPATIENT)
Dept: FAMILY MEDICINE CLINIC | Facility: CLINIC | Age: 45
End: 2025-04-14
Payer: COMMERCIAL

## 2025-04-14 VITALS
SYSTOLIC BLOOD PRESSURE: 102 MMHG | HEIGHT: 67 IN | HEART RATE: 73 BPM | BODY MASS INDEX: 32.8 KG/M2 | OXYGEN SATURATION: 96 % | WEIGHT: 209 LBS | DIASTOLIC BLOOD PRESSURE: 70 MMHG

## 2025-04-14 DIAGNOSIS — E03.9 ACQUIRED HYPOTHYROIDISM: ICD-10-CM

## 2025-04-14 DIAGNOSIS — E78.2 MIXED HYPERLIPIDEMIA: ICD-10-CM

## 2025-04-14 DIAGNOSIS — M79.621 PAIN IN RIGHT UPPER ARM: Primary | ICD-10-CM

## 2025-04-14 DIAGNOSIS — F32.A ANXIETY AND DEPRESSION: ICD-10-CM

## 2025-04-14 DIAGNOSIS — F43.10 PTSD (POST-TRAUMATIC STRESS DISORDER): ICD-10-CM

## 2025-04-14 DIAGNOSIS — F41.9 ANXIETY AND DEPRESSION: ICD-10-CM

## 2025-04-14 DIAGNOSIS — R73.03 PREDIABETES: ICD-10-CM

## 2025-04-14 DIAGNOSIS — E55.9 VITAMIN D DEFICIENCY: ICD-10-CM

## 2025-04-14 DIAGNOSIS — R53.83 FATIGUE, UNSPECIFIED TYPE: ICD-10-CM

## 2025-04-14 PROCEDURE — 99214 OFFICE O/P EST MOD 30 MIN: CPT | Performed by: NURSE PRACTITIONER

## 2025-04-14 RX ORDER — NAPROXEN 500 MG/1
500 TABLET ORAL 2 TIMES DAILY WITH MEALS
Qty: 60 TABLET | Refills: 0 | Status: SHIPPED | OUTPATIENT
Start: 2025-04-14

## 2025-04-14 RX ORDER — ARIPIPRAZOLE 15 MG/1
15 TABLET ORAL DAILY
Qty: 90 TABLET | Refills: 1 | Status: SHIPPED | OUTPATIENT
Start: 2025-04-14

## 2025-04-14 RX ORDER — LEVOTHYROXINE SODIUM 88 UG/1
88 TABLET ORAL
Qty: 100 TABLET | Refills: 3 | Status: SHIPPED | OUTPATIENT
Start: 2025-04-14

## 2025-04-14 RX ORDER — PAROXETINE 40 MG/1
40 TABLET, FILM COATED ORAL EVERY MORNING
Qty: 90 TABLET | Refills: 1 | Status: SHIPPED | OUTPATIENT
Start: 2025-04-14

## 2025-04-14 RX ORDER — METHOCARBAMOL 500 MG/1
500 TABLET, FILM COATED ORAL 3 TIMES DAILY
Qty: 60 TABLET | Refills: 0 | Status: SHIPPED | OUTPATIENT
Start: 2025-04-14

## 2025-04-14 NOTE — ASSESSMENT & PLAN NOTE
Orders:    ARIPiprazole (ABILIFY) 15 mg tablet; Take 1 tablet (15 mg total) by mouth daily    PARoxetine (PAXIL) 40 MG tablet; Take 1 tablet (40 mg total) by mouth every morning

## 2025-04-14 NOTE — ASSESSMENT & PLAN NOTE
Orders:    PARoxetine (PAXIL) 40 MG tablet; Take 1 tablet (40 mg total) by mouth every morning

## 2025-04-14 NOTE — ASSESSMENT & PLAN NOTE
Per patient, was told that his levothyroxine was recalled.  Has not had medication 2 months.  Levothyroxine filled.  Advised to obtain blood work as ordered.    Orders:    TSH, 3rd generation with Free T4 reflex; Future    levothyroxine 88 mcg tablet; Take 1 tablet (88 mcg total) by mouth daily in the early morning

## 2025-04-14 NOTE — PROGRESS NOTES
Name: Jun Simpson      : 1980      MRN: [id number]  Encounter Provider: NAY Perkins  Encounter Date: 2025   Encounter department: [facility] [address]  :  Assessment & Plan  Pain in right upper arm  Advised heat therapy, range of motion exercises/stretches, elevation, rest.  Naproxen and methocarbamol as prescribed.    Orders:    naproxen (Naprosyn) 500 mg tablet; Take 1 tablet (500 mg total) by mouth 2 (two) times a day with meals    methocarbamol (ROBAXIN) 500 mg tablet; Take 1 tablet (500 mg total) by mouth 3 (three) times a day    Fatigue, unspecified type  Advised patient to obtain blood work as previously ordered.  Orders re-ordered.    Orders:    TSH, 3rd generation with Free T4 reflex; Future    Comprehensive metabolic panel; Future    CBC and differential; Future    Lipid panel; Future    Testosterone, free, total; Future    Vitamin D 25 hydroxy; Future    Acquired hypothyroidism  Per patient, was told that his levothyroxine was recalled.  Has not had medication 2 months.  Levothyroxine filled.  Advised to obtain blood work as ordered.    Orders:    TSH, 3rd generation with Free T4 reflex; Future    levothyroxine 88 mcg tablet; Take 1 tablet (88 mcg total) by mouth daily in the early morning    Vitamin D deficiency    Orders:    Vitamin D 25 hydroxy; Future    Prediabetes    Orders:    Hemoglobin A1C; Future    Comprehensive metabolic panel; Future    Mixed hyperlipidemia    Orders:    Lipid panel; Future    PTSD (post-traumatic stress disorder)    Orders:    ARIPiprazole (ABILIFY) 15 mg tablet; Take 1 tablet (15 mg total) by mouth daily    PARoxetine (PAXIL) 40 MG tablet; Take 1 tablet (40 mg total) by mouth every morning    Anxiety and depression      Orders:    PARoxetine (PAXIL) 40 MG tablet; Take 1 tablet (40 mg total) by mouth every morning           History of Present Illness   Patient presents to the office accompanied by mother for  "4-month follow-up.  Reports right arm pain.  States pain is worse in the morning and at the end the day.  Pain has increased over the past 2 weeks.    Noted intermittent numbness and tingling in right 3rd and 4th digits.  Denies fall or injury.  Denies inability to use right arm, weakness, chest pain, shortness of breath.      Review of Systems   Constitutional:  Negative for activity change, appetite change and fatigue.   HENT:  Negative for sore throat and trouble swallowing.    Respiratory:  Negative for cough, chest tightness and shortness of breath.    Cardiovascular:  Negative for chest pain and palpitations.   Gastrointestinal:  Negative for abdominal pain, nausea and vomiting.   Musculoskeletal:  Positive for myalgias. Negative for back pain, neck pain and neck stiffness.   Skin:  Negative for color change and rash.   Neurological:  Positive for numbness (3rd and 4th digits of right hand). Negative for dizziness, light-headedness and headaches.   Psychiatric/Behavioral:  Negative for confusion. The patient is not nervous/anxious.        Objective   /70   Pulse 73   Ht 5' 7\" (1.702 m)   Wt 94.8 kg (209 lb)   SpO2 96%   BMI 32.73 kg/m²      Physical Exam  Vitals reviewed.   Constitutional:       General: He is not in acute distress.     Appearance: Normal appearance. He is not ill-appearing.   HENT:      Head: Normocephalic and atraumatic.      Right Ear: External ear normal.      Left Ear: External ear normal.      Nose: Nose normal.      Mouth/Throat:      Mouth: Mucous membranes are moist.      Pharynx: Oropharynx is clear.   Eyes:      Pupils: Pupils are equal, round, and reactive to light.   Cardiovascular:      Rate and Rhythm: Normal rate.      Pulses: Normal pulses.      Heart sounds: Normal heart sounds. No murmur heard.  Pulmonary:      Effort: Pulmonary effort is normal.      Breath sounds: Normal breath sounds.   Musculoskeletal:         General: Normal range of motion.      Right upper " arm: Tenderness (to right lateral tricep region) present.      Left upper arm: Normal.      Right wrist: Normal pulse.      Left wrist: Normal pulse.      Cervical back: Normal range of motion and neck supple.   Skin:     General: Skin is warm.   Neurological:      General: No focal deficit present.      Mental Status: He is alert and oriented to person, place, and time.   Psychiatric:         Mood and Affect: Mood normal.         Behavior: Behavior normal.

## 2025-05-28 DIAGNOSIS — E78.2 MIXED HYPERLIPIDEMIA: ICD-10-CM

## 2025-05-28 DIAGNOSIS — F41.9 ANXIETY AND DEPRESSION: ICD-10-CM

## 2025-05-28 DIAGNOSIS — F32.A ANXIETY AND DEPRESSION: ICD-10-CM

## 2025-05-28 DIAGNOSIS — F43.10 PTSD (POST-TRAUMATIC STRESS DISORDER): ICD-10-CM

## 2025-05-28 DIAGNOSIS — G62.9 NEUROPATHY: ICD-10-CM

## 2025-05-28 DIAGNOSIS — E03.9 ACQUIRED HYPOTHYROIDISM: ICD-10-CM

## 2025-05-28 DIAGNOSIS — M79.621 PAIN IN RIGHT UPPER ARM: ICD-10-CM

## 2025-05-29 RX ORDER — GABAPENTIN 800 MG/1
800 TABLET ORAL 3 TIMES DAILY
Qty: 90 TABLET | Refills: 0 | Status: SHIPPED | OUTPATIENT
Start: 2025-05-29

## 2025-05-29 RX ORDER — PROPRANOLOL HCL 20 MG
20 TABLET ORAL EVERY EVENING
Qty: 30 TABLET | Refills: 0 | Status: SHIPPED | OUTPATIENT
Start: 2025-05-29

## 2025-05-29 RX ORDER — METHOCARBAMOL 500 MG/1
500 TABLET, FILM COATED ORAL 3 TIMES DAILY
Qty: 60 TABLET | Refills: 0 | Status: SHIPPED | OUTPATIENT
Start: 2025-05-29

## 2025-05-29 RX ORDER — PRAZOSIN HYDROCHLORIDE 5 MG/1
5 CAPSULE ORAL DAILY
Qty: 30 CAPSULE | Refills: 0 | Status: SHIPPED | OUTPATIENT
Start: 2025-05-29

## 2025-05-29 RX ORDER — ROSUVASTATIN CALCIUM 5 MG/1
5 TABLET, COATED ORAL DAILY
Qty: 30 TABLET | Refills: 0 | Status: SHIPPED | OUTPATIENT
Start: 2025-05-29

## 2025-05-29 RX ORDER — HYDROXYZINE PAMOATE 50 MG/1
50 CAPSULE ORAL 3 TIMES DAILY PRN
Qty: 90 CAPSULE | Refills: 0 | Status: SHIPPED | OUTPATIENT
Start: 2025-05-29

## 2025-05-29 RX ORDER — ARIPIPRAZOLE 15 MG/1
15 TABLET ORAL DAILY
Qty: 90 TABLET | Refills: 0 | Status: SHIPPED | OUTPATIENT
Start: 2025-05-29

## 2025-05-29 RX ORDER — LEVOTHYROXINE SODIUM 88 UG/1
88 TABLET ORAL
Qty: 30 TABLET | Refills: 0 | Status: SHIPPED | OUTPATIENT
Start: 2025-05-29

## 2025-05-29 RX ORDER — NAPROXEN 500 MG/1
500 TABLET ORAL 2 TIMES DAILY WITH MEALS
Qty: 60 TABLET | Refills: 0 | Status: SHIPPED | OUTPATIENT
Start: 2025-05-29

## 2025-07-03 DIAGNOSIS — E78.2 MIXED HYPERLIPIDEMIA: ICD-10-CM

## 2025-07-03 DIAGNOSIS — F41.9 ANXIETY AND DEPRESSION: ICD-10-CM

## 2025-07-03 DIAGNOSIS — F32.A ANXIETY AND DEPRESSION: ICD-10-CM

## 2025-07-03 DIAGNOSIS — F43.10 PTSD (POST-TRAUMATIC STRESS DISORDER): ICD-10-CM

## 2025-07-06 RX ORDER — ROSUVASTATIN CALCIUM 5 MG/1
5 TABLET, COATED ORAL DAILY
Qty: 90 TABLET | Refills: 1 | OUTPATIENT
Start: 2025-07-06

## 2025-07-06 RX ORDER — PROPRANOLOL HCL 20 MG
20 TABLET ORAL EVERY EVENING
Qty: 90 TABLET | Refills: 1 | Status: SHIPPED | OUTPATIENT
Start: 2025-07-06

## 2025-07-06 RX ORDER — HYDROXYZINE PAMOATE 50 MG/1
CAPSULE ORAL
Qty: 270 CAPSULE | Refills: 1 | Status: SHIPPED | OUTPATIENT
Start: 2025-07-06

## 2025-07-06 RX ORDER — PRAZOSIN HYDROCHLORIDE 5 MG/1
5 CAPSULE ORAL DAILY
Qty: 90 CAPSULE | Refills: 1 | Status: SHIPPED | OUTPATIENT
Start: 2025-07-06